# Patient Record
Sex: MALE | Race: BLACK OR AFRICAN AMERICAN | URBAN - METROPOLITAN AREA
[De-identification: names, ages, dates, MRNs, and addresses within clinical notes are randomized per-mention and may not be internally consistent; named-entity substitution may affect disease eponyms.]

---

## 2017-01-07 DIAGNOSIS — I48.91 ATRIAL FIBRILLATION, UNSPECIFIED TYPE (HCC): ICD-10-CM

## 2017-01-09 RX ORDER — WARFARIN SODIUM 5 MG/1
TABLET ORAL
Qty: 90 TAB | Refills: 0 | Status: SHIPPED | OUTPATIENT
Start: 2017-01-09 | End: 2017-07-18 | Stop reason: SDUPTHER

## 2017-01-26 RX ORDER — ENALAPRIL MALEATE 20 MG/1
TABLET ORAL
Qty: 45 TAB | Refills: 3 | Status: SHIPPED | OUTPATIENT
Start: 2017-01-26 | End: 2017-05-26 | Stop reason: SDUPTHER

## 2017-02-05 LAB
INR PPP: 1.9 (ref 0.8–1.2)
PROTHROMBIN TIME: 19.5 SEC (ref 9.1–12)

## 2017-02-07 ENCOUNTER — OFFICE VISIT (OUTPATIENT)
Dept: CARDIOLOGY CLINIC | Age: 52
End: 2017-02-07

## 2017-02-07 VITALS
DIASTOLIC BLOOD PRESSURE: 98 MMHG | SYSTOLIC BLOOD PRESSURE: 142 MMHG | BODY MASS INDEX: 40.43 KG/M2 | HEIGHT: 74 IN | HEART RATE: 87 BPM | WEIGHT: 315 LBS

## 2017-02-07 DIAGNOSIS — E66.9 OBESITY, UNSPECIFIED OBESITY SEVERITY, UNSPECIFIED OBESITY TYPE: ICD-10-CM

## 2017-02-07 DIAGNOSIS — I48.20 CHRONIC ATRIAL FIBRILLATION (HCC): Primary | ICD-10-CM

## 2017-02-07 DIAGNOSIS — I10 ESSENTIAL HYPERTENSION: ICD-10-CM

## 2017-02-07 NOTE — PROGRESS NOTES
Ary Rand MD. [facility]              Patient: Michelle Marie  : 1965      Today's Date: 2017            HISTORY OF PRESENT ILLNESS:     History of Present Illness:  Mr. Jairon Zuñiga is here for follow-up. He is doing well overall. No CP or SOB. He remains active without complaints. Only complaint is some knee pain. BP at home is 136/87. PAST MEDICAL HISTORY:     Past Medical History   Diagnosis Date    Atrial fibrillation ([address] 02    Hypertension      Hypertensive Heart Disease    Long term (current) use of anticoagulants     Obesity     JING (obstructive sleep apnea) 07     Rx C-PAP           MEDICATIONS:     Current Outpatient Prescriptions   Medication Sig Dispense Refill    enalapril (VASOTEC) 20 mg tablet TAKE 1 1/2 TABLETS BY MOUTH DAILY 45 Tab 3    labetalol (NORMODYNE) 200 mg tablet TAKE TWO TABLETS BY MOUTH TWICE PER  Tab 0    warfarin (COUMADIN) 5 mg tablet TAKE 1 TABLET BY MOUTH DAILY. OR AS DIRECTED BY COUMADIN CLINIC. 90 Tab 0    triamterene-hydroCHLOROthiazide (MAXZIDE) 75-50 mg per tablet TAKE 1 TAB BY MOUTH DAILY. 30 Tab 3    warfarin (COUMADIN) 5 mg tablet TAKE 1 TABLET BY MOUTH DAILY. OR AS DIRECTED BY COUMADIN CLINIC. 7 Tab 0    potassium chloride (K-DUR, KLOR-CON) 20 mEq tablet Take 1 Tab by mouth daily. 30 Tab 6    multivitamin (ONE A DAY) tablet Take 1 Tab by mouth daily.  cpap machine kit by Does Not Apply route.  1 Kit 0       Allergies   Allergen Reactions    Shellfish Containing Products Swelling             SOCIAL HISTORY:     Social History   Substance Use Topics    Smoking status: Never Smoker    Smokeless tobacco: Never Used    Alcohol use No      Comment: up to 6 beers/week           FAMILY HISTORY:     Family History   Problem Relation Age of Onset    Coronary Artery Disease Mother     Heart Attack Mother              REVIEW OF SYSTEMS:       Review of Systems:  Constitutional: Negative for fever, chills  HEENT: Negative for vision changes.    Respiratory: Negative for cough  Cardiovascular: Negative for orthopnea, syncope, and PND.    Gastrointestinal: Negative for abdominal pain, diarrhea, or melena  Genitourinary: Negative for dysuria  Musculoskeletal: Negative for myalgias.    Skin: Negative for rash  Heme: No problems bleeding. Neurological: Negative for speech change and focal weakness.                PHYSICAL EXAM:       Physical Exam:   Visit Vitals    BP (!) 142/98    Pulse 87    Ht 6' 2\" (1.88 m)    Wt 345 lb (156.5 kg)    BMI 44.3 kg/m2      Patient appears generally well, mood and affect are appropriate and pleasant.  + obese  HEENT:  Hearing intact, non-icteric, normocephalic, atraumatic.    Neck Exam: Supple  Lung Exam: Clear to auscultation, even breath sounds.    Cardiac Exam: Irregularly, irregular with no murmur  Abdomen: Soft, non-tender, normal bowel sounds. No bruits or masses. + obese  Extremities: MAW, No lower extremity edema. Vascular: 2+ dorsalis pedis pulses bilaterally. Psych: Appropriate affect  Neuro - Grossly intact           LABS / OTHER STUDIES:      Lab Results   Component Value Date/Time    Sodium 140 07/23/2016 11:25 AM    Potassium 3.8 07/23/2016 11:25 AM    Chloride 101 07/23/2016 11:25 AM    CO2 21 07/23/2016 11:25 AM    Anion gap 6 11/03/2009 02:59 PM    Glucose 102 07/23/2016 11:25 AM    BUN 13 07/23/2016 11:25 AM    Creatinine 0.87 07/23/2016 11:25 AM    BUN/Creatinine ratio 15 07/23/2016 11:25 AM    GFR est  07/23/2016 11:25 AM    GFR est non- 07/23/2016 11:25 AM    Calcium 9.4 07/23/2016 11:25 AM    Bilirubin, total 1.9 07/23/2016 11:25 AM    AST (SGOT) 24 07/23/2016 11:25 AM    Alk.  phosphatase 57 07/23/2016 11:25 AM    Protein, total 7.4 07/23/2016 11:25 AM    Albumin 4.3 07/23/2016 11:25 AM    Globulin 3.6 11/03/2009 02:59 PM    A-G Ratio 1.4 07/23/2016 11:25 AM    ALT (SGPT) 24 07/23/2016 11:25 AM       Lab Results   Component Value Date/Time WBC 5.7 07/23/2016 11:25 AM    HGB 13.3 07/23/2016 11:25 AM    HCT 38.6 07/23/2016 11:25 AM    PLATELET 364 83/14/1765 11:25 AM    MCV 87 07/23/2016 11:25 AM     Lab Results   Component Value Date/Time    Cholesterol, total 163 07/23/2016 11:25 AM    HDL Cholesterol 41 07/23/2016 11:25 AM    LDL, calculated 103 07/23/2016 11:25 AM    VLDL, calculated 19 07/23/2016 11:25 AM    Triglyceride 94 07/23/2016 11:25 AM                     CARDIAC DIAGNOSTICS:       Cardiac Evaluation Includes:  Echo 4/14 - TDS, Definity, LVEF 50%, LAE  Treadmill Stress Test 10/22/15 - walked 6 min (7 METS), no ischemic EKG changes or chest pain, he was in Afib reaching 103% MPHR (he had his labetalol this AM)  Echo 6/27/16 - TDS. Definity. LVEF 50%. RV normal. Mod LAE. Mild MR     EKG 4/21/15 - Afib, PVC's, possible anteroseptal infarct age undetermined (PRWP)  EKG 4/28/16 - Afib, HR 80, possible anteroseptal infarct age undetermined, non-specific T wave abn  EKG 2/7/17 - Atrial fibrillation, PVC, Cannot rule out Lateral infarct  Old, PRWP, RAD            ASSESSMENT AND PLAN:       Assessment and Plan:  1) Chronic Afib  - continue rate control and anticoagulation (he prefers coumadin over NOAC's)      2) HTN  - BP has been a little high. - I recommended adding amlodipine 5 mg daily for tighter BP control however he would like to avoid adding any meds for now and work on weight loss. He wants to reassess in one month. 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 Definity. LVEF 50%. RV normal. Mod LAE. Mild MR     6) See me in 1 month.  Patient expressed understanding of the plan - questions were answered.      He is [address] LUDWIG MD, [address]  [address] [facility], [address]  [address] [place] [address]  [address], [address]  [place], [address] [place] [address]  Ph: [phone]   Ph [phone]        ADDENDUM   2/12/2017  Labs 11/22/16 - chol 167, , TG 68, HDL 43

## 2017-02-07 NOTE — MR AVS SNAPSHOT
Visit Information Date & Time Provider Department Dept. Phone Encounter #  
 2/7/2017  8:40 AM Herbert Jarrett MD [facility] [phone] [id number] Your Appointments 7/11/2017  9:20 AM  
ESTABLISHED PATIENT with Herbert Jarrett MD  
[facility] ([facility]) Appt Note: 6 mo fu  
 [address] [facility] [address] 100[address] [address] [address] Upcoming Health Maintenance Date Due DTaP/Tdap/Td series (1 - Tdap) 5/17/1987 FOBT Q 1 YEAR AGE 50-75 5/17/2016 INFLUENZA AGE 9 TO ADULT 8/1/2016 Allergies as of 2/7/2017  Review Complete On: 2/7/2017 By: Darian Kunz RN Severity Noted Reaction Type Reactions Shellfish Containing Products  02/16/2012    Swelling Current Immunizations  Never Reviewed No immunizations on file. Not reviewed this visit You Were Diagnosed With   
  
 Codes Comments Chronic atrial fibrillation (HCC)    -  Primary ICD-10-CM: Q98.2 ICD-9-CM: 427.31 Essential hypertension     ICD-10-CM: I10 
ICD-9-CM: 401.9 Obesity, unspecified obesity severity, unspecified obesity type     ICD-10-CM: E66.9 ICD-9-CM: 278.00 Vitals BP Pulse Height(growth percentile) Weight(growth percentile) BMI Smoking Status (!) 142/98 87 6' 2\" (1.88 m) 345 lb (156.5 kg) 44.3 kg/m2 Never Smoker Vitals History BMI and BSA Data Body Mass Index Body Surface Area 44.3 kg/m 2 2.86 m 2 Preferred Pharmacy Pharmacy Name Phone [id number] [phone] Your Updated Medication List  
  
   
This list is accurate as of: 2/7/17  9:02 AM.  Always use your most recent med list.  
  
  
  
  
 cpap machine kit  
by Does Not Apply route. enalapril 20 mg tablet Commonly known as:  VASOTEC  
TAKE 1 1/2 TABLETS BY MOUTH DAILY labetalol 200 mg tablet Commonly known as:  NORMODYNE  
TAKE TWO TABLETS BY MOUTH TWICE PER DAY  
  
 multivitamin tablet Commonly known as:  ONE A DAY Take 1 Tab by mouth daily. potassium chloride 20 mEq tablet Commonly known as:  K-DUR, KLOR-CON Take 1 Tab by mouth daily. triamterene-hydroCHLOROthiazide 75-50 mg per tablet Commonly known as:  Alinda Karen TAKE 1 TAB BY MOUTH DAILY. * warfarin 5 mg tablet Commonly known as:  COUMADIN  
TAKE 1 TABLET BY MOUTH DAILY. OR AS DIRECTED BY COUMADIN CLINIC. * warfarin 5 mg tablet Commonly known as:  COUMADIN  
TAKE 1 TABLET BY MOUTH DAILY. OR AS DIRECTED BY COUMADIN CLINIC. * Notice: This list has 2 medication(s) that are the same as other medications prescribed for you. Read the directions carefully, and ask your doctor or other care provider to review them with you. We Performed the Following AMB POC EKG ROUTINE W/ 12 LEADS, INTER & REP [54333 CPT(R)] Introducing [facility]! Amy Gee introduces [employer] patient portal. Now you can access parts of your medical record, email your doctor's office, and request medication refills online. 1. In your internet browser, go to [email or web address] 2. Click on the First Time User? Click Here link in the Sign In box. You will see the New Member Sign Up page. 3. Enter your [employer] Access Code exactly as it appears below. You will not need to use this code after youve completed the sign-up process. If you do not sign up before the expiration date, you must request a new code. · [employer] Access Code: [email or web address] Expires: 5/8/2017  9:02 AM 
 
4. Enter the last four digits of your Social Security Number (xxxx) and Date of Birth (mm/dd/yyyy) as indicated and click Submit. You will be taken to the next sign-up page. 5. Create a [employer] ID.  This will be your [employer] login ID and cannot be changed, so think of one that is secure and easy to remember. 6. Create a [employer] password. You can change your password at any time. 7. Enter your Password Reset Question and Answer. This can be used at a later time if you forget your password. 8. Enter your e-mail address. You will receive e-mail notification when new information is available in [address] 9. Click Sign Up. You can now view and download portions of your medical record. 10. Click the Download Summary menu link to download a portable copy of your medical information. If you have questions, please visit the Frequently Asked Questions section of the [employer] website. Remember, [employer] is NOT to be used for urgent needs. For medical emergencies, dial 911. Now available from your iPhone and Android! Please provide this summary of care documentation to your next provider. Your primary care clinician is listed as [address]. If you have any questions after today's visit, please call [phone].

## 2017-02-24 DIAGNOSIS — I10 ESSENTIAL HYPERTENSION, BENIGN: ICD-10-CM

## 2017-03-01 RX ORDER — LABETALOL 200 MG/1
TABLET, FILM COATED ORAL
Qty: 120 TAB | Refills: 0 | Status: SHIPPED | OUTPATIENT
Start: 2017-03-01 | End: 2017-04-01 | Stop reason: SDUPTHER

## 2017-03-07 ENCOUNTER — CLINICAL SUPPORT (OUTPATIENT)
Dept: CARDIOLOGY CLINIC | Age: 52
End: 2017-03-07

## 2017-03-07 VITALS
HEART RATE: 54 BPM | BODY MASS INDEX: 40.43 KG/M2 | HEIGHT: 74 IN | DIASTOLIC BLOOD PRESSURE: 80 MMHG | WEIGHT: 315 LBS | SYSTOLIC BLOOD PRESSURE: 132 MMHG

## 2017-03-07 DIAGNOSIS — I10 ESSENTIAL HYPERTENSION: Primary | ICD-10-CM

## 2017-03-07 NOTE — PROGRESS NOTES
States he has altered his diet. Eating fish & chicken only and minimal pasta. Weight down 5lbs. BP better. HR low.

## 2017-03-19 LAB
INR PPP: 2 (ref 0.8–1.2)
PROTHROMBIN TIME: 20.1 SEC (ref 9.1–12)

## 2017-04-01 DIAGNOSIS — I10 ESSENTIAL HYPERTENSION, BENIGN: ICD-10-CM

## 2017-04-04 RX ORDER — LABETALOL 200 MG/1
TABLET, FILM COATED ORAL
Qty: 120 TAB | Refills: 0 | Status: SHIPPED | OUTPATIENT
Start: 2017-04-04 | End: 2017-04-06 | Stop reason: SDUPTHER

## 2017-04-25 DIAGNOSIS — I10 ESSENTIAL HYPERTENSION, BENIGN: ICD-10-CM

## 2017-04-28 RX ORDER — TRIAMTERENE AND HYDROCHLOROTHIAZIDE 75; 50 MG/1; MG/1
TABLET ORAL
Qty: 30 TAB | Refills: 3 | Status: SHIPPED | OUTPATIENT
Start: 2017-04-28 | End: 2017-08-25 | Stop reason: SDUPTHER

## 2017-05-20 LAB
INR PPP: 1.9 (ref 0.8–1.2)
PROTHROMBIN TIME: 19.3 SEC (ref 9.1–12)

## 2017-07-01 LAB
INR PPP: 2 (ref 0.8–1.2)
PROTHROMBIN TIME: 20.7 SEC (ref 9.1–12)

## 2017-07-06 RX ORDER — ENALAPRIL MALEATE 20 MG/1
TABLET ORAL
Qty: 45 TAB | Refills: 3 | Status: SHIPPED | OUTPATIENT
Start: 2017-07-06 | End: 2017-07-09 | Stop reason: SDUPTHER

## 2017-07-06 NOTE — TELEPHONE ENCOUNTER
Refill of Enalapril maleate 20mg completed per VO of Dr. Trish Del Angel.     Last OV: 2/7/17  Next OV: 7/11/17

## 2017-07-09 RX ORDER — ENALAPRIL MALEATE 20 MG/1
TABLET ORAL
Qty: 45 TAB | Refills: 3 | Status: SHIPPED | OUTPATIENT
Start: 2017-07-09 | End: 2017-07-11 | Stop reason: SDUPTHER

## 2017-07-11 ENCOUNTER — OFFICE VISIT (OUTPATIENT)
Dept: CARDIOLOGY CLINIC | Age: 52
End: 2017-07-11

## 2017-07-11 VITALS
WEIGHT: 315 LBS | OXYGEN SATURATION: 97 % | BODY MASS INDEX: 40.43 KG/M2 | RESPIRATION RATE: 16 BRPM | HEART RATE: 88 BPM | SYSTOLIC BLOOD PRESSURE: 140 MMHG | DIASTOLIC BLOOD PRESSURE: 90 MMHG | HEIGHT: 74 IN

## 2017-07-11 DIAGNOSIS — I48.20 CHRONIC ATRIAL FIBRILLATION (HCC): Primary | ICD-10-CM

## 2017-07-11 DIAGNOSIS — E78.5 DYSLIPIDEMIA: ICD-10-CM

## 2017-07-11 DIAGNOSIS — I10 ESSENTIAL HYPERTENSION: ICD-10-CM

## 2017-07-11 RX ORDER — ENALAPRIL MALEATE 20 MG/1
20 TABLET ORAL 2 TIMES DAILY
Qty: 180 TAB | Refills: 3 | Status: SHIPPED | OUTPATIENT
Start: 2017-07-11 | End: 2018-05-03

## 2017-07-11 NOTE — PROGRESS NOTES
Lottie Peck MD. [facility]              Patient: Eileen Salgado  : 1965      Today's Date: 2017            HISTORY OF PRESENT ILLNESS:     History of Present Illness:  Mr. Soria Renetta here for follow-up. He is doing well overall. No CP or SOB. No dizziness. He says BP at home ~ 130/85. PAST MEDICAL HISTORY:     Past Medical History:   Diagnosis Date    Atrial fibrillation ([address] 02    Hypertension     Hypertensive Heart Disease    Long term (current) use of anticoagulants     Obesity     JING (obstructive sleep apnea) 07    Rx C-PAP         MEDICATIONS:     Current Outpatient Prescriptions   Medication Sig Dispense Refill    enalapril (VASOTEC) 20 mg tablet TAKE 1 1/2 TABLETS BY MOUTH DAILY 45 Tab 3    triamterene-hydroCHLOROthiazide (MAXZIDE) 75-50 mg per tablet TAKE 1 TAB BY MOUTH DAILY. 30 Tab 3    labetalol (NORMODYNE) 200 mg tablet TAKE TWO TABLETS BY MOUTH TWICE PER  Tab 3    warfarin (COUMADIN) 5 mg tablet TAKE 1 TABLET BY MOUTH DAILY. OR AS DIRECTED BY COUMADIN CLINIC. 90 Tab 0    warfarin (COUMADIN) 5 mg tablet TAKE 1 TABLET BY MOUTH DAILY. OR AS DIRECTED BY COUMADIN CLINIC. 7 Tab 0    potassium chloride (K-DUR, KLOR-CON) 20 mEq tablet Take 1 Tab by mouth daily. 30 Tab 6    multivitamin (ONE A DAY) tablet Take 1 Tab by mouth daily.  cpap machine kit by Does Not Apply route.  1 Kit 0       Allergies   Allergen Reactions    Shellfish Containing Products Swelling             SOCIAL HISTORY:     Social History   Substance Use Topics    Smoking status: Never Smoker    Smokeless tobacco: Never Used    Alcohol use No      Comment: up to 6 beers/week           FAMILY HISTORY:     Family History   Problem Relation Age of Onset    Coronary Artery Disease Mother     Heart Attack Mother            REVIEW OF SYSTEMS:        Review of Systems:  Constitutional: Negative for fever, chills  HEENT: Negative for vision changes.    Respiratory: Negative for cough  Cardiovascular: Negative for orthopnea, syncope, and PND.    Gastrointestinal: Negative for abdominal pain, diarrhea, or melena  Genitourinary: Negative for dysuria  Musculoskeletal: Negative for myalgias.    Skin: Negative for rash  Heme: No problems bleeding. Neurological: Negative for speech change and focal weakness.                   PHYSICAL EXAM:        Physical Exam:     Visit Vitals    /90 (BP 1 Location: Left arm, BP Patient Position: Sitting)    Pulse 88    Resp 16    Ht 6' 2\" (1.88 m)    Wt 345 lb (156.5 kg)    SpO2 97%    BMI 44.3 kg/m2       Patient appears generally well, mood and affect are appropriate and pleasant.  + obese  HEENT:  Hearing intact, non-icteric, normocephalic, atraumatic.    Neck Exam: Supple, no bruits   Lung Exam: Clear to auscultation, even breath sounds.    Cardiac Exam: Irregularly, irregular with no murmur - distant   Abdomen: Soft, non-tender, normal bowel sounds. No bruits or masses. + obese  Extremities: MAW, No lower extremity edema. Vascular: 2+ dorsalis pedis pulses bilaterally. Psych: Appropriate affect  Neuro - Grossly intact              LABS / OTHER STUDIES:         Lab Results   Component Value Date/Time    Sodium 140 07/23/2016 11:25 AM    Potassium 3.8 07/23/2016 11:25 AM    Chloride 101 07/23/2016 11:25 AM    CO2 21 07/23/2016 11:25 AM    Anion gap 6 11/03/2009 02:59 PM    Glucose 102 07/23/2016 11:25 AM    BUN 13 07/23/2016 11:25 AM    Creatinine 0.87 07/23/2016 11:25 AM    BUN/Creatinine ratio 15 07/23/2016 11:25 AM    GFR est  07/23/2016 11:25 AM    GFR est non- 07/23/2016 11:25 AM    Calcium 9.4 07/23/2016 11:25 AM    Bilirubin, total 1.9 07/23/2016 11:25 AM    AST (SGOT) 24 07/23/2016 11:25 AM    Alk.  phosphatase 57 07/23/2016 11:25 AM    Protein, total 7.4 07/23/2016 11:25 AM    Albumin 4.3 07/23/2016 11:25 AM    Globulin 3.6 11/03/2009 02:59 PM    A-G Ratio 1.4 07/23/2016 11:25 AM    ALT (SGPT) 24 07/23/2016 11:25 AM Lab Results   Component Value Date/Time    WBC 5.7 07/23/2016 11:25 AM    HGB 13.3 07/23/2016 11:25 AM    HCT 38.6 07/23/2016 11:25 AM    PLATELET 876 35/12/5773 11:25 AM    MCV 87 07/23/2016 11:25 AM         Labs 11/22/16 - chol 167, , TG 68, HDL 43                CARDIAC DIAGNOSTICS:        Cardiac Evaluation Includes:  Echo 4/14 - TDS, Definity, LVEF 50%, LAE  Treadmill Stress Test 10/22/15 - walked 6 min (7 METS), no ischemic EKG changes or chest pain, he was in Afib reaching 103% MPHR (he had his labetalol this AM)  Echo 6/27/16 - TDS. Definity. LVEF 50%. RV normal. Mod LAE. Mild MR      EKG 4/21/15 - Afib, PVC's, possible anteroseptal infarct age undetermined (PRWP)  EKG 4/28/16 - Afib, HR 80, possible anteroseptal infarct age undetermined, non-specific T wave abn  EKG 2/7/17 - Atrial fibrillation, PVC, Cannot rule out Lateral infarct  Old, PRWP, RAD              ASSESSMENT AND PLAN:        Assessment and Plan:  1) Chronic Afib  - continue rate control and anticoagulation (he prefers coumadin over NOAC's)       2) HTN  - BP is borderline high  - He is trying to work on diet   - Will increase enalapril to 40 mg daily and continue maxzide and labetalol.   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 Definity. LVEF 50%. RV normal. Mod LAE. Mild MR      6) Check labs.   See me in 6 months.  Patient expressed understanding of the plan - questions were answered.      He is [employer] fan.       Armand Huertas MD, [address]  [address], [address] [place] [address]  Ph: [phone]    [phone]  Andreina Stringer  ADDENDUM   7/27/2017  Treadmill Stress Test 7/26/17 - walked 4 min (7.0 METS), normal study     I called and left patient a VM. Cleared for DOT from my standpoint.

## 2017-07-11 NOTE — PROGRESS NOTES
Visit Vitals    /90 (BP 1 Location: Left arm, BP Patient Position: Sitting)    Pulse 88    Resp 16    Ht 6' 2\" (1.88 m)    Wt 345 lb (156.5 kg)    SpO2 97%    BMI 44.3 kg/m2

## 2017-07-12 LAB
ALBUMIN SERPL-MCNC: 4.3 G/DL (ref 3.5–5.5)
ALBUMIN/GLOB SERPL: 1.4 {RATIO} (ref 1.2–2.2)
ALP SERPL-CCNC: 56 IU/L (ref 39–117)
ALT SERPL-CCNC: 17 IU/L (ref 0–44)
AST SERPL-CCNC: 20 IU/L (ref 0–40)
BILIRUB SERPL-MCNC: 1.6 MG/DL (ref 0–1.2)
BUN SERPL-MCNC: 17 MG/DL (ref 6–24)
BUN/CREAT SERPL: 17 (ref 9–20)
CALCIUM SERPL-MCNC: 9.4 MG/DL (ref 8.7–10.2)
CHLORIDE SERPL-SCNC: 100 MMOL/L (ref 96–106)
CHOLEST SERPL-MCNC: 167 MG/DL (ref 100–199)
CO2 SERPL-SCNC: 24 MMOL/L (ref 18–29)
CREAT SERPL-MCNC: 1.02 MG/DL (ref 0.76–1.27)
ERYTHROCYTE [DISTWIDTH] IN BLOOD BY AUTOMATED COUNT: 15.1 % (ref 12.3–15.4)
GLOBULIN SER CALC-MCNC: 3.1 G/DL (ref 1.5–4.5)
GLUCOSE SERPL-MCNC: 119 MG/DL (ref 65–99)
HCT VFR BLD AUTO: 37.7 % (ref 37.5–51)
HDLC SERPL-MCNC: 44 MG/DL
HGB BLD-MCNC: 13 G/DL (ref 12.6–17.7)
INTERPRETATION, 910389: NORMAL
LDLC SERPL CALC-MCNC: 106 MG/DL (ref 0–99)
MCH RBC QN AUTO: 30.2 PG (ref 26.6–33)
MCHC RBC AUTO-ENTMCNC: 34.5 G/DL (ref 31.5–35.7)
MCV RBC AUTO: 88 FL (ref 79–97)
PLATELET # BLD AUTO: 171 X10E3/UL (ref 150–379)
POTASSIUM SERPL-SCNC: 3.7 MMOL/L (ref 3.5–5.2)
PROT SERPL-MCNC: 7.4 G/DL (ref 6–8.5)
RBC # BLD AUTO: 4.31 X10E6/UL (ref 4.14–5.8)
SODIUM SERPL-SCNC: 140 MMOL/L (ref 134–144)
TRIGL SERPL-MCNC: 85 MG/DL (ref 0–149)
VLDLC SERPL CALC-MCNC: 17 MG/DL (ref 5–40)
WBC # BLD AUTO: 4.9 X10E3/UL (ref 3.4–10.8)

## 2017-07-18 DIAGNOSIS — I48.91 ATRIAL FIBRILLATION, UNSPECIFIED TYPE (HCC): ICD-10-CM

## 2017-07-21 RX ORDER — WARFARIN SODIUM 5 MG/1
TABLET ORAL
Qty: 90 TAB | Refills: 0 | Status: SHIPPED | OUTPATIENT
Start: 2017-07-21 | End: 2017-08-16 | Stop reason: SDUPTHER

## 2017-07-26 ENCOUNTER — CLINICAL SUPPORT (OUTPATIENT)
Dept: CARDIOLOGY CLINIC | Age: 52
End: 2017-07-26

## 2017-07-26 DIAGNOSIS — R94.31 ABNORMAL EKG: Primary | ICD-10-CM

## 2017-07-26 NOTE — PROGRESS NOTES
Explained procedure to patient, monitoring EKG/HR/BP,  walking on treadmill,  and risks/discomforts. All concerns and questions addressed. Consent obtained. See scanned report.  ordered and Dr. Travis Feliciano read study. ID verified per protocol. Pt  reported no symptoms at completion of protocol.

## 2017-07-27 ENCOUNTER — TELEPHONE (OUTPATIENT)
Dept: CARDIOLOGY CLINIC | Age: 52
End: 2017-07-27

## 2017-07-27 NOTE — TELEPHONE ENCOUNTER
Pt would like to  the lab results today to take with him to the DOT test tomorrow. Please give pt a call back at [phone].       Thank you,  Rogelio Johnson

## 2017-07-27 NOTE — TELEPHONE ENCOUNTER
Patient called regarding his test results, he stated he needs for DOT with his employer.  He can be reached at [phone]

## 2017-07-28 NOTE — TELEPHONE ENCOUNTER
MD Mariely Mckeon, RN        Caller: Unspecified (Yesterday,  9:21 AM)                     Aldona Daughters - stress test was normal.  Please fill out whatever DOT physical thing he needs.  Cleared from my standpoint.       Thanks,   [employer]

## 2017-07-28 NOTE — TELEPHONE ENCOUNTER
L/m for pt all paperwork is ready but that I am at the [place] office today and he will need to come here.

## 2017-08-16 DIAGNOSIS — I48.91 ATRIAL FIBRILLATION, UNSPECIFIED TYPE (HCC): ICD-10-CM

## 2017-08-18 RX ORDER — WARFARIN SODIUM 5 MG/1
TABLET ORAL
Qty: 90 TAB | Refills: 0 | Status: SHIPPED | OUTPATIENT
Start: 2017-08-18 | End: 2018-02-22 | Stop reason: SDUPTHER

## 2017-08-25 DIAGNOSIS — I10 ESSENTIAL HYPERTENSION, BENIGN: ICD-10-CM

## 2017-08-28 DIAGNOSIS — I10 ESSENTIAL HYPERTENSION, BENIGN: ICD-10-CM

## 2017-08-28 RX ORDER — TRIAMTERENE AND HYDROCHLOROTHIAZIDE 75; 50 MG/1; MG/1
TABLET ORAL
Qty: 30 TAB | Refills: 3 | Status: SHIPPED | OUTPATIENT
Start: 2017-08-28 | End: 2017-12-24 | Stop reason: SDUPTHER

## 2017-08-28 RX ORDER — LABETALOL 200 MG/1
TABLET, FILM COATED ORAL
Qty: 120 TAB | Refills: 3 | Status: SHIPPED | OUTPATIENT
Start: 2017-08-28 | End: 2017-12-24 | Stop reason: SDUPTHER

## 2017-10-01 LAB
INR PPP: 2 (ref 0.8–1.2)
PROTHROMBIN TIME: 20 SEC (ref 9.1–12)

## 2017-11-09 ENCOUNTER — DOCUMENTATION ONLY (OUTPATIENT)
Dept: CARDIOLOGY CLINIC | Age: 52
End: 2017-11-09

## 2017-11-09 NOTE — PROGRESS NOTES
Spoke with pt today,states he has been getting INR checks,and I asked pt to call me when he goes so I can look for it and call him back with results. Last INR was 2.0,on 09/30/17. Pt states he will go on sat. for INR check,and I will check on Monday for results. States he work weekdays.

## 2017-11-17 LAB
INR PPP: 1.7 (ref 0.8–1.2)
PROTHROMBIN TIME: 17.3 SEC (ref 9.1–12)

## 2017-11-30 DIAGNOSIS — I48.91 ATRIAL FIBRILLATION, UNSPECIFIED TYPE (HCC): ICD-10-CM

## 2017-12-21 ENCOUNTER — TELEPHONE (OUTPATIENT)
Dept: CARDIOLOGY CLINIC | Age: 52
End: 2017-12-21

## 2017-12-21 DIAGNOSIS — I48.20 CHRONIC ATRIAL FIBRILLATION (HCC): Primary | ICD-10-CM

## 2017-12-21 NOTE — TELEPHONE ENCOUNTER
Patient needs a new new PT/INR order. Please fax to [employer] at  [phone]. Patient would like a return call to confirm order has been sent. He can be reached at [phone] or [phone].  Thank you

## 2017-12-23 LAB
INR PPP: 1.7 (ref 0.8–1.2)
PROTHROMBIN TIME: 17.7 SEC (ref 9.1–12)

## 2017-12-24 DIAGNOSIS — I10 ESSENTIAL HYPERTENSION, BENIGN: ICD-10-CM

## 2017-12-28 RX ORDER — LABETALOL 200 MG/1
TABLET, FILM COATED ORAL
Qty: 120 TAB | Refills: 3 | Status: SHIPPED | OUTPATIENT
Start: 2017-12-28 | End: 2018-04-20 | Stop reason: SDUPTHER

## 2017-12-28 RX ORDER — TRIAMTERENE AND HYDROCHLOROTHIAZIDE 75; 50 MG/1; MG/1
TABLET ORAL
Qty: 30 TAB | Refills: 3 | Status: SHIPPED | OUTPATIENT
Start: 2017-12-28 | End: 2018-04-20 | Stop reason: SDUPTHER

## 2017-12-28 NOTE — TELEPHONE ENCOUNTER
Requested Prescriptions     Pending Prescriptions Disp Refills    labetalol (NORMODYNE) 200 mg tablet [Pharmacy Med Name: LABETALOL  MG TABLET] 120 Tab 3     Sig: TAKE TWO TABLETS BY MOUTH TWICE PER DAY    triamterene-hydroCHLOROthiazide (MAXZIDE) 75-50 mg per tablet [Pharmacy Med Name: TRIAMTERENE-HCTZ 75-50 MG TAB] 30 Tab 3     Sig: TAKE 1 TAB BY MOUTH DAILY.      Last OV 7/11/17  Next OV 1/25/18    Pharmacy verified    Thank you, AP

## 2018-02-04 LAB
INR PPP: 2.3 (ref 0.8–1.2)
PROTHROMBIN TIME: 23.3 SEC (ref 9.1–12)

## 2018-02-05 ENCOUNTER — TELEPHONE (OUTPATIENT)
Dept: CARDIOLOGY CLINIC | Age: 53
End: 2018-02-05

## 2018-02-22 DIAGNOSIS — I48.91 ATRIAL FIBRILLATION, UNSPECIFIED TYPE (HCC): ICD-10-CM

## 2018-02-22 NOTE — TELEPHONE ENCOUNTER
Pharmacy verified. Requested Prescriptions     Pending Prescriptions Disp Refills    warfarin (COUMADIN) 5 mg tablet 90 Tab 0     Thanks!   Blaire Jeff

## 2018-02-23 RX ORDER — WARFARIN SODIUM 5 MG/1
TABLET ORAL
Qty: 90 TAB | Refills: 0 | Status: SHIPPED | OUTPATIENT
Start: 2018-02-23 | End: 2018-05-22 | Stop reason: SDUPTHER

## 2018-04-07 LAB — INR, EXTERNAL: 1.9 (ref 2–3)

## 2018-04-08 LAB
INR PPP: 1.9 (ref 0.8–1.2)
PROTHROMBIN TIME: 19.4 SEC (ref 9.1–12)

## 2018-04-11 ENCOUNTER — TELEPHONE ANTICOAG (OUTPATIENT)
Dept: CARDIOLOGY CLINIC | Age: 53
End: 2018-04-11

## 2018-04-11 NOTE — PROGRESS NOTES

## 2018-05-03 ENCOUNTER — OFFICE VISIT (OUTPATIENT)
Dept: CARDIOLOGY CLINIC | Age: 53
End: 2018-05-03

## 2018-05-03 VITALS
DIASTOLIC BLOOD PRESSURE: 90 MMHG | WEIGHT: 315 LBS | SYSTOLIC BLOOD PRESSURE: 130 MMHG | HEIGHT: 74 IN | HEART RATE: 76 BPM | OXYGEN SATURATION: 99 % | RESPIRATION RATE: 16 BRPM | BODY MASS INDEX: 40.43 KG/M2

## 2018-05-03 DIAGNOSIS — I10 ESSENTIAL HYPERTENSION: ICD-10-CM

## 2018-05-03 DIAGNOSIS — I48.0 PAROXYSMAL ATRIAL FIBRILLATION (HCC): Primary | ICD-10-CM

## 2018-05-03 RX ORDER — ENALAPRIL MALEATE 20 MG/1
20 TABLET ORAL 2 TIMES DAILY
Qty: 60 TAB | Refills: 12 | Status: SHIPPED | OUTPATIENT
Start: 2018-05-03 | End: 2018-05-30 | Stop reason: SDUPTHER

## 2018-05-03 RX ORDER — ENALAPRIL MALEATE 20 MG/1
20 TABLET ORAL 2 TIMES DAILY
Qty: 180 TAB | Refills: 3 | Status: SHIPPED | OUTPATIENT
Start: 2018-05-03 | End: 2018-05-03 | Stop reason: SDUPTHER

## 2018-05-03 NOTE — PROGRESS NOTES
Chief Complaint   Patient presents with    Annual Exam     afib       1. Have you been to the ER, urgent care clinic since your last visit? Hospitalized since your last visit? No    2. Have you seen or consulted any other health care providers outside of the [address] [place] [address] since your last visit? Include any pap smears or colon screening.  No

## 2018-05-03 NOTE — PROGRESS NOTES
Allie Soto MD. [facility] [place]              Patient: Cali Chandler  : 1965      Today's Date: 5/3/2018            HISTORY OF PRESENT ILLNESS:     History of Present Illness:  Mr. Matt Kirkland is here for follow-up. Feels well. -130/85-90 at home. No CP or SOB. No complaints. PAST MEDICAL HISTORY:     Past Medical History:   Diagnosis Date    Atrial fibrillation ([address] 02    Hypertension     Hypertensive Heart Disease    Long term (current) use of anticoagulants     Obesity     JING (obstructive sleep apnea) 07    Rx C-PAP             MEDICATIONS:     Current Outpatient Prescriptions   Medication Sig Dispense Refill    labetalol (NORMODYNE) 200 mg tablet TAKE TWO TABLETS BY MOUTH TWICE PER  Tab 0    triamterene-hydroCHLOROthiazide (MAXZIDE) 75-50 mg per tablet TAKE 1 TAB BY MOUTH DAILY. 30 Tab 0    enalapril (VASOTEC) 20 mg tablet TAKE 1 1/2 TABLETS BY MOUTH DAILY 45 Tab 1    warfarin (COUMADIN) 5 mg tablet TAKE 1 TABLET BY MOUTH DAILY. OR AS DIRECTED BY COUMADIN CLINIC. (Patient taking differently: Pt takes 0.5 tabs on M&W and 1 tablet on the remaining days.) 90 Tab 0    warfarin (COUMADIN) 5 mg tablet TAKE 1 TABLET BY MOUTH DAILY. OR AS DIRECTED BY COUMADIN CLINIC. 7 Tab 0    multivitamin (ONE A DAY) tablet Take 1 Tab by mouth daily.  cpap machine kit by Does Not Apply route.  1 Kit 0       Allergies   Allergen Reactions    Shellfish Containing Products Swelling             SOCIAL HISTORY:     Social History   Substance Use Topics    Smoking status: Never Smoker    Smokeless tobacco: Never Used    Alcohol use 3.6 oz/week     0 Standard drinks or equivalent, 6 Cans of beer per week      Comment: up to 6 beers/week           FAMILY HISTORY:     Family History   Problem Relation Age of Onset    Coronary Artery Disease Mother     Heart Attack Mother            REVIEW OF SYSTEMS:        Review of Systems:  Constitutional: Negative for fever, chills  HEENT: Negative for vision changes.    Respiratory: Negative for cough  Cardiovascular: Negative for orthopnea, syncope, and PND.    Gastrointestinal: Negative for abdominal pain, diarrhea, or melena  Genitourinary: Negative for dysuria  Musculoskeletal: Negative for myalgias.    Skin: Negative for rash  Heme: No problems bleeding. Neurological: Negative for speech change and focal weakness.                   PHYSICAL EXAM:        Physical Exam:   Visit Vitals    /90 (BP 1 Location: Left arm, BP Patient Position: Sitting)    Pulse 76    Resp 16    Ht 6' 2\" (1.88 m)    Wt 345 lb (156.5 kg)    SpO2 99%    BMI 44.3 kg/m2       Patient appears generally well, mood and affect are appropriate and pleasant.  + obese  HEENT:  Hearing intact, non-icteric, normocephalic, atraumatic.    Neck Exam: Supple, no bruits   Lung Exam: Clear to auscultation, even breath sounds.    Cardiac Exam: Irregularly, irregular with no murmur - distant   Abdomen: Soft, non-tender,  + obese  Extremities: MAW, No lower extremity edema. Psych: Appropriate affect  Neuro - Grossly intact              LABS / OTHER STUDIES:          Lab Results   Component Value Date/Time    Sodium 140 07/11/2017 10:14 AM    Potassium 3.7 07/11/2017 10:14 AM    Chloride 100 07/11/2017 10:14 AM    CO2 24 07/11/2017 10:14 AM    Anion gap 6 11/03/2009 02:59 PM    Glucose 119 (H) 07/11/2017 10:14 AM    BUN 17 07/11/2017 10:14 AM    Creatinine 1.02 07/11/2017 10:14 AM    BUN/Creatinine ratio 17 07/11/2017 10:14 AM    GFR est AA 98 07/11/2017 10:14 AM    GFR est non-AA 85 07/11/2017 10:14 AM    Calcium 9.4 07/11/2017 10:14 AM    Bilirubin, total 1.6 (H) 07/11/2017 10:14 AM    AST (SGOT) 20 07/11/2017 10:14 AM    Alk.  phosphatase 56 07/11/2017 10:14 AM    Protein, total 7.4 07/11/2017 10:14 AM    Albumin 4.3 07/11/2017 10:14 AM    Globulin 3.6 11/03/2009 02:59 PM    A-G Ratio 1.4 07/11/2017 10:14 AM    ALT (SGPT) 17 07/11/2017 10:14 AM       Lab Results Component Value Date/Time    Cholesterol, total 167 07/11/2017 10:14 AM    HDL Cholesterol 44 07/11/2017 10:14 AM    LDL, calculated 106 (H) 07/11/2017 10:14 AM    VLDL, calculated 17 07/11/2017 10:14 AM    Triglyceride 85 07/11/2017 10:14 AM         Lab Results   Component Value Date/Time    WBC 4.9 07/11/2017 10:14 AM    HGB 13.0 07/11/2017 10:14 AM    HCT 37.7 07/11/2017 10:14 AM    PLATELET 655 39/79/3241 10:14 AM    MCV 88 07/11/2017 10:14 AM               CARDIAC DIAGNOSTICS:        Cardiac Evaluation Includes:  Echo 4/14 - TDS, Definity, LVEF 50%, LAE  Treadmill Stress Test 10/22/15 - walked 6 min (7 METS), no ischemic EKG changes or chest pain, he was in Afib reaching 103% MPHR (he had his labetalol this AM)  Echo 6/27/16 - TDS. Definity. LVEF 50%. RV normal. Mod LAE. Mild MR  Treadmill Stress Test 7/26/17 - walked 4 min (7.0 METS), normal study         EKG 4/21/15 - Afib, PVC's, possible anteroseptal infarct age undetermined (PRWP)  EKG 4/28/16 - Afib, HR 80, possible anteroseptal infarct age undetermined, non-specific T wave abn  EKG 2/7/17 - Atrial fibrillation, PVC, Cannot rule out Lateral infarct  Old, PRWP, RAD  EKG 5/3/18 - Afib, PRWP, PVC              ASSESSMENT AND PLAN:        Assessment and Plan:  1) Chronic Afib  - continue rate control and anticoagulation (he prefers coumadin over NOAC's)       2) HTN  - BP is borderline high  - He is trying to work on diet   - Goal BP at home < 130/80 ideally   - Increase enalapril to 20 BID. Continue other meds. If BP remains high, he is to call me. - recheck labs        3) Morbid Obesity  - he's lost 30 lbs but then gained it back - he is working on trying to lose weight       4) Pre-participation stress test for the [employer] in 7/17 was normal  - he can participate from my standpoint   - he will need stress test every 2 years       5) Abnormal EKG - lateral Q's   - he has no cardiac complaints  - Echo 6/27/16 - TDS. Definity. LVEF 50%. RV normal. Mod LAE. Mild MR      6) Check labs. See me in 6 months.  Patient expressed understanding of the plan - questions were answered.      He is [employer] fan. Lives with his girl friend.    Koki Scott MD, [address], [facility] [address]. Allison Riggins.  [place], [address] [place] [address]  Ph: [phone]   Ph [phone]

## 2018-05-19 LAB — INR, EXTERNAL: 3.1 (ref 2–3)

## 2018-05-20 LAB
BUN SERPL-MCNC: 13 MG/DL (ref 6–24)
BUN/CREAT SERPL: 13 (ref 9–20)
CALCIUM SERPL-MCNC: 9.2 MG/DL (ref 8.7–10.2)
CHLORIDE SERPL-SCNC: 98 MMOL/L (ref 96–106)
CO2 SERPL-SCNC: 23 MMOL/L (ref 18–29)
CREAT SERPL-MCNC: 0.98 MG/DL (ref 0.76–1.27)
ERYTHROCYTE [DISTWIDTH] IN BLOOD BY AUTOMATED COUNT: 13.8 % (ref 12.3–15.4)
GFR SERPLBLD CREATININE-BSD FMLA CKD-EPI: 102 ML/MIN/1.73
GFR SERPLBLD CREATININE-BSD FMLA CKD-EPI: 88 ML/MIN/1.73
GLUCOSE SERPL-MCNC: 77 MG/DL (ref 65–99)
HCT VFR BLD AUTO: 37 % (ref 37.5–51)
HGB BLD-MCNC: 12.7 G/DL (ref 13–17.7)
INR PPP: 3.1 (ref 0.8–1.2)
MCH RBC QN AUTO: 30.8 PG (ref 26.6–33)
MCHC RBC AUTO-ENTMCNC: 34.3 G/DL (ref 31.5–35.7)
MCV RBC AUTO: 90 FL (ref 79–97)
PLATELET # BLD AUTO: 178 X10E3/UL (ref 150–379)
POTASSIUM SERPL-SCNC: 4.4 MMOL/L (ref 3.5–5.2)
PROTHROMBIN TIME: 30.2 SEC (ref 9.1–12)
RBC # BLD AUTO: 4.13 X10E6/UL (ref 4.14–5.8)
SODIUM SERPL-SCNC: 142 MMOL/L (ref 134–144)
WBC # BLD AUTO: 4.2 X10E3/UL (ref 3.4–10.8)

## 2018-05-22 ENCOUNTER — TELEPHONE ANTICOAG (OUTPATIENT)
Dept: CARDIOLOGY CLINIC | Age: 53
End: 2018-05-22

## 2018-05-22 DIAGNOSIS — I48.91 ATRIAL FIBRILLATION, UNSPECIFIED TYPE (HCC): ICD-10-CM

## 2018-05-22 NOTE — PROGRESS NOTES

## 2018-05-23 DIAGNOSIS — I10 ESSENTIAL HYPERTENSION, BENIGN: ICD-10-CM

## 2018-05-24 RX ORDER — WARFARIN SODIUM 5 MG/1
TABLET ORAL
Qty: 90 TAB | Refills: 0 | Status: SHIPPED | OUTPATIENT
Start: 2018-05-24 | End: 2018-09-13 | Stop reason: SDUPTHER

## 2018-05-24 RX ORDER — TRIAMTERENE AND HYDROCHLOROTHIAZIDE 75; 50 MG/1; MG/1
TABLET ORAL
Qty: 30 TAB | Refills: 0 | Status: SHIPPED | OUTPATIENT
Start: 2018-05-24 | End: 2018-06-22 | Stop reason: SDUPTHER

## 2018-05-30 DIAGNOSIS — I48.0 PAROXYSMAL ATRIAL FIBRILLATION (HCC): ICD-10-CM

## 2018-05-30 DIAGNOSIS — I10 ESSENTIAL HYPERTENSION: ICD-10-CM

## 2018-05-30 RX ORDER — ENALAPRIL MALEATE 20 MG/1
20 TABLET ORAL 2 TIMES DAILY
Qty: 60 TAB | Refills: 12 | Status: SHIPPED | OUTPATIENT
Start: 2018-05-30 | End: 2019-03-18 | Stop reason: SINTOL

## 2018-06-01 DIAGNOSIS — I10 ESSENTIAL HYPERTENSION, BENIGN: ICD-10-CM

## 2018-06-04 DIAGNOSIS — I48.19 PERSISTENT ATRIAL FIBRILLATION (HCC): Primary | ICD-10-CM

## 2018-06-04 RX ORDER — ENALAPRIL MALEATE 20 MG/1
TABLET ORAL
Qty: 45 TAB | Refills: 2 | Status: SHIPPED | OUTPATIENT
Start: 2018-06-04 | End: 2018-11-15 | Stop reason: SDUPTHER

## 2018-06-04 RX ORDER — LABETALOL 200 MG/1
TABLET, FILM COATED ORAL
Qty: 120 TAB | Refills: 0 | Status: SHIPPED | OUTPATIENT
Start: 2018-06-04 | End: 2018-07-02 | Stop reason: SDUPTHER

## 2018-06-07 RX ORDER — WARFARIN SODIUM 5 MG/1
TABLET ORAL
Qty: 90 TAB | Refills: 0 | Status: SHIPPED | OUTPATIENT
Start: 2018-06-07 | End: 2018-11-01 | Stop reason: SDUPTHER

## 2018-07-02 DIAGNOSIS — I10 ESSENTIAL HYPERTENSION, BENIGN: ICD-10-CM

## 2018-07-03 RX ORDER — LABETALOL 200 MG/1
TABLET, FILM COATED ORAL
Qty: 120 TAB | Refills: 0 | Status: SHIPPED | OUTPATIENT
Start: 2018-07-03 | End: 2018-08-01 | Stop reason: SDUPTHER

## 2018-07-15 LAB
INR PPP: 2.2 (ref 0.8–1.2)
PROTHROMBIN TIME: 22.2 SEC (ref 9.1–12)

## 2018-07-18 ENCOUNTER — TELEPHONE ANTICOAG (OUTPATIENT)
Dept: CARDIOLOGY CLINIC | Age: 53
End: 2018-07-18

## 2018-07-18 LAB — INR, EXTERNAL: 2.2 (ref 2–3)

## 2018-07-23 ENCOUNTER — TELEPHONE (OUTPATIENT)
Dept: CARDIOLOGY CLINIC | Age: 53
End: 2018-07-23

## 2018-07-23 NOTE — TELEPHONE ENCOUNTER
Returned call. Informed patient requested records have been set at  for . Patient states he will probably be in tomorrow to .

## 2018-07-23 NOTE — TELEPHONE ENCOUNTER
Pt requesting his most recent EKG, stress test, and coumadin levels for . Pt wants a call when ready to be picked up.      Phone: [phone]

## 2018-08-01 DIAGNOSIS — I10 ESSENTIAL HYPERTENSION, BENIGN: ICD-10-CM

## 2018-08-02 RX ORDER — LABETALOL 200 MG/1
TABLET, FILM COATED ORAL
Qty: 120 TAB | Refills: 0 | Status: SHIPPED | OUTPATIENT
Start: 2018-08-02 | End: 2018-09-04 | Stop reason: SDUPTHER

## 2018-09-04 DIAGNOSIS — I10 ESSENTIAL HYPERTENSION, BENIGN: ICD-10-CM

## 2018-09-04 RX ORDER — LABETALOL 200 MG/1
TABLET, FILM COATED ORAL
Qty: 120 TAB | Refills: 1 | Status: SHIPPED | OUTPATIENT
Start: 2018-09-04 | End: 2018-11-05 | Stop reason: SDUPTHER

## 2018-09-13 DIAGNOSIS — I48.91 ATRIAL FIBRILLATION, UNSPECIFIED TYPE (HCC): ICD-10-CM

## 2018-09-16 LAB
INR PPP: 2.6 (ref 0.8–1.2)
PROTHROMBIN TIME: 25.7 SEC (ref 9.1–12)

## 2018-09-18 NOTE — TELEPHONE ENCOUNTER
Pharmacy verified. Requested Prescriptions     Pending Prescriptions Disp Refills    warfarin (COUMADIN) 5 mg tablet [Pharmacy Med Name: WARFARIN SODIUM 5 MG TABLET] 90 Tab 0     Sig: TAKE 1 TABLET BY MOUTH DAILY. OR AS DIRECTED BY COUMADIN CLINIC.      Thanks

## 2018-09-18 NOTE — TELEPHONE ENCOUNTER
Am going to forward this refill to our coumadin clinic. It looks as if he has not been checked since July? Need to confirm.

## 2018-09-20 RX ORDER — WARFARIN SODIUM 5 MG/1
TABLET ORAL
Qty: 90 TAB | Refills: 0 | Status: SHIPPED | OUTPATIENT
Start: 2018-09-20 | End: 2018-11-15 | Stop reason: SDUPTHER

## 2018-09-21 ENCOUNTER — TELEPHONE (OUTPATIENT)
Dept: CARDIOLOGY CLINIC | Age: 53
End: 2018-09-21

## 2018-09-21 NOTE — TELEPHONE ENCOUNTER
Refill requested for Warafin 5mg [facility] confirmed. Phone [phone] Thanks

## 2018-10-13 LAB — INR, EXTERNAL: 3.1 (ref 2–3)

## 2018-10-14 LAB
INR PPP: 3.1 (ref 0.8–1.2)
PROTHROMBIN TIME: 30.5 SEC (ref 9.1–12)

## 2018-10-25 DIAGNOSIS — I10 ESSENTIAL HYPERTENSION, BENIGN: ICD-10-CM

## 2018-10-25 DIAGNOSIS — I48.91 ATRIAL FIBRILLATION, UNSPECIFIED TYPE (HCC): ICD-10-CM

## 2018-10-26 RX ORDER — TRIAMTERENE AND HYDROCHLOROTHIAZIDE 75; 50 MG/1; MG/1
TABLET ORAL
Qty: 30 TAB | Refills: 0 | Status: SHIPPED | OUTPATIENT
Start: 2018-10-26 | End: 2018-11-23 | Stop reason: SDUPTHER

## 2018-10-31 RX ORDER — WARFARIN SODIUM 5 MG/1
TABLET ORAL
Qty: 7 TAB | Refills: 0 | Status: CANCELLED | OUTPATIENT
Start: 2018-10-31

## 2018-10-31 NOTE — TELEPHONE ENCOUNTER
Rita Bills keep getting refill requests for Mr Manjinder Motley warfarin however, it looks like his last check was July!

## 2018-11-01 ENCOUNTER — TELEPHONE ANTICOAG (OUTPATIENT)
Dept: CARDIOLOGY CLINIC | Age: 53
End: 2018-11-01

## 2018-11-01 RX ORDER — WARFARIN SODIUM 5 MG/1
TABLET ORAL
Qty: 90 TAB | Refills: 0 | Status: SHIPPED | OUTPATIENT
Start: 2018-11-01 | End: 2018-11-15 | Stop reason: SDUPTHER

## 2018-11-11 LAB
INR PPP: 2.1 (ref 0.8–1.2)
PROTHROMBIN TIME: 20.6 SEC (ref 9.1–12)

## 2018-11-13 ENCOUNTER — TELEPHONE ANTICOAG (OUTPATIENT)
Dept: CARDIOLOGY CLINIC | Age: 53
End: 2018-11-13

## 2018-11-13 LAB — INR, EXTERNAL: 2.1 (ref 2–3)

## 2018-11-15 ENCOUNTER — OFFICE VISIT (OUTPATIENT)
Dept: CARDIOLOGY CLINIC | Age: 53
End: 2018-11-15

## 2018-11-15 VITALS
SYSTOLIC BLOOD PRESSURE: 144 MMHG | WEIGHT: 315 LBS | RESPIRATION RATE: 16 BRPM | BODY MASS INDEX: 40.43 KG/M2 | HEART RATE: 82 BPM | DIASTOLIC BLOOD PRESSURE: 86 MMHG | HEIGHT: 74 IN

## 2018-11-15 DIAGNOSIS — E78.5 DYSLIPIDEMIA: ICD-10-CM

## 2018-11-15 DIAGNOSIS — I10 ESSENTIAL HYPERTENSION: ICD-10-CM

## 2018-11-15 DIAGNOSIS — I48.20 CHRONIC ATRIAL FIBRILLATION (HCC): Primary | ICD-10-CM

## 2018-11-15 NOTE — PROGRESS NOTES
Chief Complaint   Patient presents with    Irregular Heart Beat     AFIB    Hypertension     Visit Vitals  /86 (BP 1 Location: Left arm, BP Patient Position: Sitting)   Pulse 82   Resp 16   Ht 6' 2\" (1.88 m)   Wt (!) 355 lb (161 kg)   BMI 45.58 kg/m²

## 2018-11-15 NOTE — PROGRESS NOTES
Deborah Bearden MD. [facility] [place]              Patient: Sanjeev Paiz  : 1965      Today's Date: 11/15/2018            HISTORY OF PRESENT ILLNESS:     History of Present Illness:  Here for follow-up. Sprained MCL a few months playing basketball. /82 at home. PAST MEDICAL HISTORY:     Past Medical History:   Diagnosis Date    Atrial fibrillation ([address] 02    Hypertension     Hypertensive Heart Disease    Long term (current) use of anticoagulants     Obesity     JING (obstructive sleep apnea) 07    Rx C-PAP             MEDICATIONS:     Current Outpatient Medications   Medication Sig Dispense Refill    labetalol (NORMODYNE) 200 mg tablet TAKE TWO TABLETS BY MOUTH TWICE PER  Tab 0    triamterene-hydroCHLOROthiazide (MAXZIDE) 75-50 mg per tablet TAKE 1 TAB BY MOUTH DAILY. 30 Tab 0    enalapril (VASOTEC) 20 mg tablet Take 1 Tab by mouth two (2) times a day. 60 Tab 12    warfarin (COUMADIN) 5 mg tablet TAKE 1 TABLET BY MOUTH DAILY. OR AS DIRECTED BY COUMADIN CLINIC. (Patient taking differently: TAKE 1 TABLET BY MOUTH DAILY. OR AS DIRECTED BY COUMADIN CLINIC.) 7 Tab 0    multivitamin (ONE A DAY) tablet Take 1 Tab by mouth daily.  cpap machine kit by Does Not Apply route. 1 Kit 0       Allergies   Allergen Reactions    Shellfish Containing Products Swelling             SOCIAL HISTORY:     Social History     Tobacco Use    Smoking status: Never Smoker    Smokeless tobacco: Never Used   Substance Use Topics    Alcohol use:  Yes     Alcohol/week: 3.6 oz     Types: 6 Cans of beer per week     Comment: up to 6 beers/week    Drug use: No         FAMILY HISTORY:     Family History   Problem Relation Age of Onset    Coronary Artery Disease Mother     Heart Attack Mother              REVIEW OF SYSTEMS:        Review of Systems:  Constitutional: Negative for fever, chills  HEENT: Negative for vision changes.    Respiratory: Negative for cough  Cardiovascular: Negative for orthopnea, syncope, and PND.    Gastrointestinal: Negative for abdominal pain, diarrhea, or melena  Genitourinary: Negative for dysuria  Musculoskeletal: Negative for myalgias.    Skin: Negative for rash  Heme: No problems bleeding. Neurological: Negative for speech change and focal weakness.                   PHYSICAL EXAM:        Physical Exam:     Visit Vitals  /86 (BP 1 Location: Left arm, BP Patient Position: Sitting)   Pulse 82   Resp 16   Ht 6' 2\" (1.88 m)   Wt (!) 355 lb (161 kg)   BMI 45.58 kg/m²         Patient appears generally well, mood and affect are appropriate and pleasant.  + obese  HEENT:  Hearing intact, non-icteric, normocephalic, atraumatic.    Neck Exam: Supple, no bruits   Lung Exam: Clear to auscultation, even breath sounds.    Cardiac Exam: Irregularly, irregular with no murmur - distant   Abdomen: Soft, non-tender,  + obese  Extremities: MAW, No pitting lower extremity edema. Psych: Appropriate affect  Neuro - Grossly intact              LABS / OTHER STUDIES:          Lab Results   Component Value Date/Time    Sodium 142 05/19/2018 12:06 PM    Potassium 4.4 05/19/2018 12:06 PM    Chloride 98 05/19/2018 12:06 PM    CO2 23 05/19/2018 12:06 PM    Anion gap 6 11/03/2009 02:59 PM    Glucose 77 05/19/2018 12:06 PM    BUN 13 05/19/2018 12:06 PM    Creatinine 0.98 05/19/2018 12:06 PM    BUN/Creatinine ratio 13 05/19/2018 12:06 PM    GFR est  05/19/2018 12:06 PM    GFR est non-AA 88 05/19/2018 12:06 PM    Calcium 9.2 05/19/2018 12:06 PM    Bilirubin, total 1.6 (H) 07/11/2017 10:14 AM    AST (SGOT) 20 07/11/2017 10:14 AM    Alk.  phosphatase 56 07/11/2017 10:14 AM    Protein, total 7.4 07/11/2017 10:14 AM    Albumin 4.3 07/11/2017 10:14 AM    Globulin 3.6 11/03/2009 02:59 PM    A-G Ratio 1.4 07/11/2017 10:14 AM    ALT (SGPT) 17 07/11/2017 10:14 AM       Lab Results   Component Value Date/Time    WBC 4.2 05/19/2018 12:06 PM    HGB 12.7 (L) 05/19/2018 12:06 PM    HCT 37.0 (L) 05/19/2018 12:06 PM    PLATELET 029 00/85/5766 12:06 PM    MCV 90 05/19/2018 12:06 PM       Lab Results   Component Value Date/Time    Cholesterol, total 167 07/11/2017 10:14 AM    HDL Cholesterol 44 07/11/2017 10:14 AM    LDL, calculated 106 (H) 07/11/2017 10:14 AM    VLDL, calculated 17 07/11/2017 10:14 AM    Triglyceride 85 07/11/2017 10:14 AM               CARDIAC DIAGNOSTICS:        Cardiac Evaluation Includes:  Echo 4/14 - TDS, Definity, LVEF 50%, LAE  Treadmill Stress Test 10/22/15 - walked 6 min (7 METS), no ischemic EKG changes or chest pain, he was in Afib reaching 103% MPHR (he had his labetalol this AM)  Echo 6/27/16 - TDS. Definity. LVEF 50%. RV normal. Mod LAE. Mild MR  Treadmill Stress Test 7/26/17 - walked 4 min (7.0 METS), normal study          EKG 4/21/15 - Afib, PVC's, possible anteroseptal infarct age undetermined (PRWP)  EKG 4/28/16 - Afib, HR 80, possible anteroseptal infarct age undetermined, non-specific T wave abn  EKG 2/7/17 - Atrial fibrillation, PVC, Cannot rule out Lateral infarct  Old, PRWP, RAD  EKG 5/3/18 - Afib, PRWP, PVC              ASSESSMENT AND PLAN:        Assessment and Plan:  1) Chronic Afib  - continue rate control and anticoagulation (he prefers coumadin over NOAC's)       2) HTN  - BP is borderline high - 138/82 or so at home  - He is trying to work on diet   - Goal BP at home < 130/80 ideally -- home readings are close - discussed options - he wants to work on weight loss before adding meds (such amlodipine)       3) Morbid Obesity  - had lost 30 lbs but then gained it back - he is working on trying to lose weight       4) Pre-participation stress test for the [employer] in 7/17 was normal  - he can participate from my standpoint   - he will need stress test every 2 years - check at next visit       5) Abnormal EKG - lateral Q's   - he has no cardiac complaints  - Echo 6/27/16 - TDS. Definity. LVEF 50%. RV normal. Mod LAE.  Mild MR      6) See me in 6 months.  Patient expressed understanding of the plan - questions were answered.      He is [employer] fan. Lives with his girl friend. Working on renovating bathroom.    [address] Botello MD, [address]  Ph: [phone]   Ph [phone]

## 2018-12-23 LAB
INR PPP: 2.5 (ref 0.8–1.2)
PROTHROMBIN TIME: 24.4 SEC (ref 9.1–12)

## 2019-01-20 LAB
INR PPP: 2.1 (ref 0.8–1.2)
PROTHROMBIN TIME: 20.6 SEC (ref 9.1–12)

## 2019-02-02 DIAGNOSIS — I10 ESSENTIAL HYPERTENSION, BENIGN: ICD-10-CM

## 2019-02-03 RX ORDER — LABETALOL 200 MG/1
TABLET, FILM COATED ORAL
Qty: 120 TAB | Refills: 1 | Status: SHIPPED | OUTPATIENT
Start: 2019-02-03 | End: 2019-04-03 | Stop reason: SDUPTHER

## 2019-02-17 LAB
INR PPP: 2.3 (ref 0.8–1.2)
PROTHROMBIN TIME: 22.5 SEC (ref 9.1–12)

## 2019-03-18 ENCOUNTER — TELEPHONE (OUTPATIENT)
Dept: CARDIOLOGY CLINIC | Age: 54
End: 2019-03-18

## 2019-03-18 DIAGNOSIS — I10 ESSENTIAL HYPERTENSION: Primary | ICD-10-CM

## 2019-03-18 RX ORDER — AMLODIPINE BESYLATE 5 MG/1
5 TABLET ORAL DAILY
Qty: 90 TAB | Refills: 2 | Status: SHIPPED | OUTPATIENT
Start: 2019-03-18 | End: 2019-06-06

## 2019-03-18 NOTE — TELEPHONE ENCOUNTER
Patient states he went to [facility] over the weekend and was taken off of his Enalapril 20 mg due to angioedema. He was told to call & ask the doctor if he wants to put the patient on a different medication.       Phone: [phone]

## 2019-03-18 NOTE — TELEPHONE ENCOUNTER
Clinic note rec'd from [facility] and in Dr Roseline Crooks office so that he may address this message.

## 2019-03-18 NOTE — TELEPHONE ENCOUNTER
Add amlodipine 5 mg daily. Watch for LE swelling on amlodipine. Also, get enough K+ in diet since on diuretics (and now ACE-I off) --- recheck a BMP in 2 weeks. Follow BP at home and give us readings in 2 weeks.      Thanks,   SK

## 2019-03-23 LAB
INR PPP: 1.7 (ref 0.8–1.2)
PROTHROMBIN TIME: 16.8 SEC (ref 9.1–12)

## 2019-04-01 DIAGNOSIS — I10 ESSENTIAL HYPERTENSION: ICD-10-CM

## 2019-04-03 DIAGNOSIS — I10 ESSENTIAL HYPERTENSION, BENIGN: ICD-10-CM

## 2019-04-09 RX ORDER — LABETALOL 200 MG/1
TABLET, FILM COATED ORAL
Qty: 120 TAB | Refills: 1 | Status: SHIPPED | OUTPATIENT
Start: 2019-04-09 | End: 2019-06-01 | Stop reason: SDUPTHER

## 2019-05-07 ENCOUNTER — TELEPHONE (OUTPATIENT)
Dept: CARDIOLOGY CLINIC | Age: 54
End: 2019-05-07

## 2019-05-07 DIAGNOSIS — I48.91 ATRIAL FIBRILLATION, UNSPECIFIED TYPE (HCC): ICD-10-CM

## 2019-05-07 NOTE — TELEPHONE ENCOUNTER
Margie Zamora just received a prescription request for coumadin from this pt. He saw Dr. Wale Newton on 11/2018, and the last note from you was 11/2018 as well. He has still been having his Pt/INR's drawn. His last INR was 1/7 on 3/22/19. The last time we refilled his coumadin was in 2016 and there were only 7 pills, 0 refills. How should I continue?   Thanks for your help!!

## 2019-05-08 RX ORDER — WARFARIN SODIUM 5 MG/1
TABLET ORAL
Qty: 7 TAB | Refills: 0 | Status: SHIPPED | OUTPATIENT
Start: 2019-05-08 | End: 2019-05-15 | Stop reason: SDUPTHER

## 2019-05-14 ENCOUNTER — TELEPHONE ANTICOAG (OUTPATIENT)
Dept: CARDIOLOGY CLINIC | Age: 54
End: 2019-05-14

## 2019-05-14 LAB
INR PPP: 2 (ref 0.8–1.2)
INR, EXTERNAL: 2 (ref 2–3)
PROTHROMBIN TIME: 20.1 SEC (ref 9.1–12)

## 2019-05-15 DIAGNOSIS — I48.91 ATRIAL FIBRILLATION, UNSPECIFIED TYPE (HCC): ICD-10-CM

## 2019-05-15 RX ORDER — WARFARIN SODIUM 5 MG/1
TABLET ORAL
Qty: 30 TAB | Refills: 1 | Status: SHIPPED | OUTPATIENT
Start: 2019-05-15 | End: 2019-07-11 | Stop reason: SDUPTHER

## 2019-06-01 DIAGNOSIS — I10 ESSENTIAL HYPERTENSION, BENIGN: ICD-10-CM

## 2019-06-03 RX ORDER — LABETALOL 200 MG/1
TABLET, FILM COATED ORAL
Qty: 180 TAB | Refills: 1 | Status: SHIPPED | OUTPATIENT
Start: 2019-06-03 | End: 2019-07-27 | Stop reason: SDUPTHER

## 2019-06-06 ENCOUNTER — OFFICE VISIT (OUTPATIENT)
Dept: CARDIOLOGY CLINIC | Age: 54
End: 2019-06-06

## 2019-06-06 VITALS
DIASTOLIC BLOOD PRESSURE: 88 MMHG | BODY MASS INDEX: 39.14 KG/M2 | SYSTOLIC BLOOD PRESSURE: 138 MMHG | HEART RATE: 98 BPM | HEIGHT: 74 IN | WEIGHT: 305 LBS | OXYGEN SATURATION: 96 % | RESPIRATION RATE: 18 BRPM

## 2019-06-06 DIAGNOSIS — I48.20 CHRONIC ATRIAL FIBRILLATION (HCC): Primary | ICD-10-CM

## 2019-06-06 DIAGNOSIS — R94.31 ABNORMAL EKG: ICD-10-CM

## 2019-06-06 DIAGNOSIS — I10 ESSENTIAL HYPERTENSION: ICD-10-CM

## 2019-06-06 RX ORDER — DILTIAZEM HYDROCHLORIDE 120 MG/1
120 CAPSULE, COATED, EXTENDED RELEASE ORAL DAILY
Qty: 30 CAP | Refills: 12 | Status: SHIPPED | OUTPATIENT
Start: 2019-06-06 | End: 2020-04-21

## 2019-06-06 NOTE — PROGRESS NOTES
Chief Complaint   Patient presents with    Irregular Heart Beat    Hypertension    Cholesterol Problem     Visit Vitals  /88 (BP 1 Location: Left arm, BP Patient Position: Sitting)   Pulse 98   Resp 18   Ht 6' 2\" (1.88 m)   Wt 305 lb (138.3 kg)   SpO2 96%   BMI 39.16 kg/m²     Chest pain denied  SOB denied  Dizziness denied  Swelling strained ankle in right leg  Recent hospital visit denied  Refills denied

## 2019-06-06 NOTE — PROGRESS NOTES
Kylie aSntos MD. [facility] [place]              Patient: Agus Morrow  : 1965      Today's Date: 2019            HISTORY OF PRESENT ILLNESS:     History of Present Illness:  Here for follow-up. Doing OK overall. Had to get off of ACE-I due to angioedema - but he thinks it may have been due to muscle relaxant given same day. No CP or much SOB -- although did have JOSUE with stress test today. PAST MEDICAL HISTORY:     Past Medical History:   Diagnosis Date    Atrial fibrillation ([address] 02    Hypertension     Hypertensive Heart Disease    Long term (current) use of anticoagulants     Obesity     JING (obstructive sleep apnea) 07    Rx C-PAP             MEDICATIONS:     Current Outpatient Medications   Medication Sig Dispense Refill    labetalol (NORMODYNE) 200 mg tablet TAKE TWO TABLETS BY MOUTH TWICE PER  Tab 1    warfarin (COUMADIN) 5 mg tablet TAKE 1 TABLET BY MOUTH DAILY. OR AS DIRECTED BY COUMADIN CLINIC. 30 Tab 1    amLODIPine (NORVASC) 5 mg tablet Take 1 Tab by mouth daily. 90 Tab 2    triamterene-hydroCHLOROthiazide (MAXZIDE) 75-50 mg per tablet TAKE 1 TAB BY MOUTH DAILY. 90 Tab 2    multivitamin (ONE A DAY) tablet Take 1 Tab by mouth daily.  cpap machine kit by Does Not Apply route. 1 Kit 0       Allergies   Allergen Reactions    Enalapril Angioedema    Shellfish Containing Products Swelling             SOCIAL HISTORY:     Social History     Tobacco Use    Smoking status: Never Smoker    Smokeless tobacco: Never Used   Substance Use Topics    Alcohol use: Yes     Alcohol/week: 3.6 oz     Types: 6 Cans of beer per week     Comment: up to 6 beers/week    Drug use: No         FAMILY HISTORY:     Family History   Problem Relation Age of Onset    Coronary Artery Disease Mother     Heart Attack Mother              REVIEW OF SYSTEMS:        Review of Systems:  Constitutional: Negative for fever, chills  HEENT: Negative for vision changes.    Respiratory: Negative for cough  Cardiovascular: Negative for orthopnea, syncope, and PND.    Gastrointestinal: Negative for abdominal pain, diarrhea, or melena  Genitourinary: Negative for dysuria  Musculoskeletal: Negative for myalgias.    Skin: Negative for rash  Heme: No problems bleeding. Neurological: Negative for speech change and focal weakness.                   PHYSICAL EXAM:        Physical Exam:   Visit Vitals  /88 (BP 1 Location: Left arm, BP Patient Position: Sitting)   Pulse 98   Resp 18   Ht 6' 2\" (1.88 m)   Wt 305 lb (138.3 kg)   SpO2 96%   BMI 39.16 kg/m²          Patient appears generally well, mood and affect are appropriate and pleasant.  + obese  HEENT:  Hearing intact, non-icteric, normocephalic, atraumatic.    Neck Exam: Supple, no bruits   Lung Exam: Clear to auscultation, even breath sounds.    Cardiac Exam: Irregularly, irregular with no murmur - distant   Abdomen: Soft, non-tender,  + obese  Extremities: MAW, No pitting lower extremity edema. Psych: Appropriate affect  Neuro - Grossly intact              LABS / OTHER STUDIES:          Lab Results   Component Value Date/Time    Sodium 142 05/19/2018 12:06 PM    Potassium 4.4 05/19/2018 12:06 PM    Chloride 98 05/19/2018 12:06 PM    CO2 23 05/19/2018 12:06 PM    Anion gap 6 11/03/2009 02:59 PM    Glucose 77 05/19/2018 12:06 PM    BUN 13 05/19/2018 12:06 PM    Creatinine 0.98 05/19/2018 12:06 PM    BUN/Creatinine ratio 13 05/19/2018 12:06 PM    GFR est  05/19/2018 12:06 PM    GFR est non-AA 88 05/19/2018 12:06 PM    Calcium 9.2 05/19/2018 12:06 PM    Bilirubin, total 1.6 (H) 07/11/2017 10:14 AM    AST (SGOT) 20 07/11/2017 10:14 AM    Alk.  phosphatase 56 07/11/2017 10:14 AM    Protein, total 7.4 07/11/2017 10:14 AM    Albumin 4.3 07/11/2017 10:14 AM    Globulin 3.6 11/03/2009 02:59 PM    A-G Ratio 1.4 07/11/2017 10:14 AM    ALT (SGPT) 17 07/11/2017 10:14 AM     Lab Results   Component Value Date/Time    WBC 4.2 05/19/2018 12:06 PM    HGB 12.7 (L) 05/19/2018 12:06 PM    HCT 37.0 (L) 05/19/2018 12:06 PM    PLATELET 263 33/23/6010 12:06 PM    MCV 90 05/19/2018 12:06 PM       Lab Results   Component Value Date/Time    Cholesterol, total 167 07/11/2017 10:14 AM    HDL Cholesterol 44 07/11/2017 10:14 AM    LDL, calculated 106 (H) 07/11/2017 10:14 AM    VLDL, calculated 17 07/11/2017 10:14 AM    Triglyceride 85 07/11/2017 10:14 AM           CARDIAC DIAGNOSTICS:        Cardiac Evaluation Includes:  Echo 4/14 - TDS, Definity, LVEF 50%, LAE  Treadmill Stress Test 10/22/15 - walked 6 min (7 METS), no ischemic EKG changes or chest pain, he was in Afib reaching 103% MPHR (he had his labetalol this AM)  Echo 6/27/16 - TDS. Definity. LVEF 50%. RV normal. Mod LAE.  Mild MR  Treadmill Stress Test 7/26/17 - walked 4 min (7.0 METS), normal study      Treadmill stress test 6/6/19 - EKG with Afib, HR, 105, PRWP at start of study --  walked 6 min (7.5 METS), normal stress test         EKG 4/21/15 - Afib, PVC's, possible anteroseptal infarct age undetermined (PRWP)  EKG 4/28/16 - Afib, HR 80, possible anteroseptal infarct age undetermined, non-specific T wave abn  EKG 2/7/17 - Atrial fibrillation, PVC, Cannot rule out Lateral infarct  Old, PRWP, RAD  EKG 5/3/18 - Afib, PRWP, PVC              ASSESSMENT AND PLAN:        Assessment and Plan:  1) Chronic Afib  - continue rate control and anticoagulation (he prefers coumadin over NOAC's)   - On 6/6/19 - HR fast on labetalol (Fast during the stress test even though he took labetalol this morning) --> will add cardizem to labetalol to see if HR better (will stop Norvasc) ---> see NP in a couple of weeks to assess HR and BP control         2) HTN  - BP is borderline high - 133/83 or so at home  - Goal BP at least < 135/85 at home   - Switch from Norvasc to Cardizem as above       3) Morbid Obesity   - in 2019 he has lost 50 lbs   - continue to work weight loss       4) Pre-participation stress test for the [employer] in 7/17 was normal  - he can participate from my standpoint   - he will need stress test every 2 years       5) Abnormal EKG - PRWP   - Denies major cardiac complaints  - Recheck an echo in 2-3 weeks       6) See NP in 2-3 weeks. See me in 12 months.  Patient expressed understanding of the plan - questions were answered.      He is [employer] fan.  Lives with his girl friend.    [address] Jessica Gil MD, [address] [place] [address], [address]      [address] [place] [address]. [address] [place] [address], [address] [place] [address]  Ph: [phone]                               Ph [phone]       ADDENDUM   6/30/2019  Echo 6/26/19 - TDS. Definity. Mild LV dilation. LVEF 55%. Mod LAE. Will have NP call with results.

## 2019-06-23 LAB
INR PPP: 1.9 (ref 0.8–1.2)
PROTHROMBIN TIME: 18.8 SEC (ref 9.1–12)

## 2019-06-26 ENCOUNTER — OFFICE VISIT (OUTPATIENT)
Dept: CARDIOLOGY CLINIC | Age: 54
End: 2019-06-26

## 2019-06-26 VITALS
HEIGHT: 74 IN | BODY MASS INDEX: 39.25 KG/M2 | WEIGHT: 305.8 LBS | HEART RATE: 80 BPM | RESPIRATION RATE: 18 BRPM | SYSTOLIC BLOOD PRESSURE: 138 MMHG | DIASTOLIC BLOOD PRESSURE: 86 MMHG

## 2019-06-26 DIAGNOSIS — G47.33 OSA ON CPAP: ICD-10-CM

## 2019-06-26 DIAGNOSIS — R94.31 ABNORMAL EKG: ICD-10-CM

## 2019-06-26 DIAGNOSIS — I10 ESSENTIAL HYPERTENSION: ICD-10-CM

## 2019-06-26 DIAGNOSIS — Z99.89 OSA ON CPAP: ICD-10-CM

## 2019-06-26 DIAGNOSIS — I48.21 PERMANENT ATRIAL FIBRILLATION (HCC): Primary | ICD-10-CM

## 2019-06-26 DIAGNOSIS — Z79.01 CHRONIC ANTICOAGULATION: ICD-10-CM

## 2019-06-26 NOTE — PROGRESS NOTES
YOUGN Giordano  [id number]  [place], [address] [facility]    Office [phone]  Fax [phone]        Farooq Leong is a 49 y.o. male who is followed outpatient by Dr. Felipa Reynolds and was last seen 6/6/19. Patient is here today for follow up of BP and HR. Assessment  Encounter Diagnoses   Name Primary?  Permanent atrial fibrillation (HCC) Yes    Essential hypertension     Abnormal EKG     JING on CPAP     Chronic anticoagulation      Recommendations:  Chronic Afib  - continue rate control and anticoagulation (he prefers coumadin over NOAC's)   - HR well controlled on labetalol and diltiazem     HTN  - BP OK, <135/85 at home  - cont current meds      Morbid Obesity   - in 2019 he has lost 50 lbs   - continue to work weight loss        Abnormal EKG   - Denies major cardiac complaints  - echo today (results pending)    F/u with Dr. Felipa Reynolds in 6 months or PRN. Phone f/u to review echo.       Patient understands the plan. All questions were answered to the patient's satisfaction.     Medication Side Effects and Warnings were discussed with patient: yes  Patient Labs were reviewed and or requested:  yes  Patient Past Records were reviewed and or requested: yes     I appreciate the opportunity to be involved in patient's care. Please do not hesitate to contact us with questions or concerns. Subjective:  Here for follow-up after several medication changes last visit. States BP actually improved, now low 130s/80s consistently. Continues to work on weight loss and works our regularly 3x per week.       Patient denies any exertional chest pain, dyspnea, palpitations, syncope, orthopnea, edema or paroxysmal nocturnal dyspnea. Uses CPAP with good compliance.       Cardiac testing  Echo 4/14 - TDS, Definity, LVEF 50%, LAE  Treadmill Stress Test 10/22/15 - walked 6 min (7 METS), no ischemic EKG changes or chest pain, he was in Afib reaching 103% MPHR (he had his labetalol this AM)  Echo 6/27/16 - TDS. Definity. LVEF 50%. RV normal. Mod LAE. Mild MR  Treadmill Stress Test 7/26/17 - walked 4 min (7.0 METS), normal study      Treadmill stress test 6/6/19 - EKG with Afib, HR, 105, PRWP at start of study --  walked 6 min (7.5 METS), normal stress test      EKG 4/21/15 - Afib, PVC's, possible anteroseptal infarct age undetermined (PRWP)  EKG 4/28/16 - Afib, HR 80, possible anteroseptal infarct age undetermined, non-specific T wave abn  EKG 2/7/17 - Atrial fibrillation, PVC, Cannot rule out Lateral infarct  Old, PRWP, RAD  EKG 5/3/18 - Afib, PRWP, PVC    Past Medical History:   Diagnosis Date    Atrial fibrillation ([facility] [address] 4/11/02    Hypertension     Hypertensive Heart Disease    Long term (current) use of anticoagulants     Obesity     JING (obstructive sleep apnea) 12/14/07    Rx C-PAP        Current Outpatient Medications   Medication Sig Dispense Refill    dilTIAZem CD (CARDIZEM CD) 120 mg ER capsule Take 1 Cap by mouth daily. 30 Cap 12    labetalol (NORMODYNE) 200 mg tablet TAKE TWO TABLETS BY MOUTH TWICE PER  Tab 1    warfarin (COUMADIN) 5 mg tablet TAKE 1 TABLET BY MOUTH DAILY. OR AS DIRECTED BY COUMADIN CLINIC. 30 Tab 1    triamterene-hydroCHLOROthiazide (MAXZIDE) 75-50 mg per tablet TAKE 1 TAB BY MOUTH DAILY. 90 Tab 2    multivitamin (ONE A DAY) tablet Take 1 Tab by mouth daily.  cpap machine kit by Does Not Apply route. 1 Kit 0       Allergies   Allergen Reactions    Enalapril Angioedema    Shellfish Containing Products Swelling          Review of Systems  Constitutional: Negative for fever, chills, malaise/fatigue and diaphoresis. Respiratory: Negative for cough, hemoptysis, sputum production, shortness of breath and wheezing. Cardiovascular: Negative for chest pain, palpitations, orthopnea, claudication, leg swelling and PND. Gastrointestinal: Negative for heartburn, nausea, vomiting, blood in stool and melena.   Genitourinary: Negative for dysuria and flank pain. Neurological: Negative for focal weakness, seizures, loss of consciousness, weakness and headaches. Endo/Heme/Allergies: Negative for abnormal bleeding. Psychiatric/Behavioral: Negative for memory loss.        Physical Exam    Visit Vitals  /86 (BP 1 Location: Left arm) Comment: per echo tech   Pulse 80   Resp 18   Ht 6' 2\" (1.88 m)   Wt 305 lb 12.8 oz (138.7 kg)   BMI 39.26 kg/m²     Wt Readings from Last 3 Encounters:   06/26/19 305 lb 12.8 oz (138.7 kg)   06/26/19 305 lb (138.3 kg)   06/06/19 305 lb (138.3 kg)      Patient appears generally well, mood and affect are appropriate and pleasant.  + obese  HEENT:  Hearing intact, non-icteric, normocephalic, atraumatic.    Neck Exam: Supple,    Lung Exam: Clear to auscultation, even breath sounds.    Cardiac Exam: Irregularly, irregular with no murmur - distant heart sounds  Abdomen: Soft, non-tender,  + obese  Extremities: no CHAD  Psych: Appropriate affect  Neuro - Grossly intact    Labs  No results found for: HBA1C, HGBE8, RPF1RKPU, JKC0IRUM, ANR3ACPW    Lab Results   Component Value Date/Time    Sodium 142 05/19/2018 12:06 PM    Sodium 140 07/11/2017 10:14 AM    Sodium 140 07/23/2016 11:25 AM    Sodium 140 04/28/2016 09:37 AM    Sodium 141 08/22/2015 11:25 AM    Potassium 4.4 05/19/2018 12:06 PM    Chloride 98 05/19/2018 12:06 PM    Chloride 100 07/11/2017 10:14 AM    Chloride 101 07/23/2016 11:25 AM    Chloride 99 04/28/2016 09:37 AM    Chloride 101 08/22/2015 11:25 AM    CO2 23 05/19/2018 12:06 PM    CO2 24 07/11/2017 10:14 AM    CO2 21 07/23/2016 11:25 AM    CO2 27 04/28/2016 09:37 AM    CO2 23 08/22/2015 11:25 AM    Anion gap 6 11/03/2009 02:59 PM    Glucose 77 05/19/2018 12:06 PM    Glucose 119 (H) 07/11/2017 10:14 AM    Glucose 102 (H) 07/23/2016 11:25 AM    Glucose 128 (H) 04/28/2016 09:37 AM    Glucose 99 08/22/2015 11:25 AM    BUN 13 05/19/2018 12:06 PM    BUN 17 07/11/2017 10:14 AM    BUN 13 07/23/2016 11:25 AM    BUN 14 04/28/2016 09:37 AM    BUN 13 08/22/2015 11:25 AM    Creatinine 0.98 05/19/2018 12:06 PM    Creatinine 1.02 07/11/2017 10:14 AM    Creatinine 0.87 07/23/2016 11:25 AM    Creatinine 0.92 04/28/2016 09:37 AM    Creatinine 0.89 08/22/2015 11:25 AM    BUN/Creatinine ratio 13 05/19/2018 12:06 PM    BUN/Creatinine ratio 17 07/11/2017 10:14 AM    BUN/Creatinine ratio 15 07/23/2016 11:25 AM    BUN/Creatinine ratio 15 04/28/2016 09:37 AM    BUN/Creatinine ratio 15 08/22/2015 11:25 AM    GFR est  05/19/2018 12:06 PM    GFR est AA 98 07/11/2017 10:14 AM    GFR est  07/23/2016 11:25 AM    GFR est  04/28/2016 09:37 AM    GFR est  08/22/2015 11:25 AM    GFR est non-AA 88 05/19/2018 12:06 PM    GFR est non-AA 85 07/11/2017 10:14 AM    GFR est non- 07/23/2016 11:25 AM    GFR est non-AA 97 04/28/2016 09:37 AM    GFR est non- 08/22/2015 11:25 AM    Calcium 9.2 05/19/2018 12:06 PM    Calcium 9.4 07/11/2017 10:14 AM    Calcium 9.4 07/23/2016 11:25 AM    Calcium 9.3 04/28/2016 09:37 AM    Calcium 9.2 08/22/2015 11:25 AM     Lab Results   Component Value Date/Time    Cholesterol, total 167 07/11/2017 10:14 AM    Cholesterol, total 163 07/23/2016 11:25 AM    Cholesterol, total 159 08/22/2015 11:25 AM    HDL Cholesterol 44 07/11/2017 10:14 AM    HDL Cholesterol 41 07/23/2016 11:25 AM    HDL Cholesterol 43 08/22/2015 11:25 AM    LDL, calculated 106 (H) 07/11/2017 10:14 AM    LDL, calculated 103 (H) 07/23/2016 11:25 AM    LDL, calculated 105 (H) 08/22/2015 11:25 AM    Triglyceride 85 07/11/2017 10:14 AM    Triglyceride 94 07/23/2016 11:25 AM    Triglyceride 57 08/22/2015 11:25 AM     YOUNG Giordano

## 2019-06-26 NOTE — PROGRESS NOTES
Visit Vitals  /86 (BP 1 Location: Left arm) Comment: per echo tech   Pulse 80   Resp 18   Ht 6' 2\" (1.88 m)   Wt 305 lb 12.8 oz (138.7 kg)   BMI 39.26 kg/m²       Patient is here for follow up. Had an echo today. No complaints.

## 2019-06-26 NOTE — PATIENT INSTRUCTIONS
Continue current meds; I will call you with echo results. For now, schedule follow-up to see Dr. Saira Kearns in 6 months.

## 2019-07-03 ENCOUNTER — TELEPHONE (OUTPATIENT)
Dept: CARDIOLOGY CLINIC | Age: 54
End: 2019-07-03

## 2019-07-03 NOTE — TELEPHONE ENCOUNTER
----- Message from Myles Vanessa MD sent at 6/30/2019 10:46 PM EDT -----  Regarding: please call   Can you please call pt with results     Echo 6/26/19 - TDS. Definity. Mild LV dilation. LVEF 55%. Mod LAE.      Thanks,  SK

## 2019-07-03 NOTE — TELEPHONE ENCOUNTER
Patient states he was speaking to Jatinder Lowry NP but the call was disconnected. He is attempting to reach her again.      Phone: [phone]

## 2019-07-11 DIAGNOSIS — I48.91 ATRIAL FIBRILLATION, UNSPECIFIED TYPE (HCC): ICD-10-CM

## 2019-07-12 RX ORDER — WARFARIN SODIUM 5 MG/1
TABLET ORAL
Qty: 30 TAB | Refills: 1 | Status: SHIPPED | OUTPATIENT
Start: 2019-07-12 | End: 2019-08-05 | Stop reason: SDUPTHER

## 2019-07-22 ENCOUNTER — TELEPHONE (OUTPATIENT)
Dept: CARDIOLOGY CLINIC | Age: 54
End: 2019-07-22

## 2019-07-22 NOTE — TELEPHONE ENCOUNTER
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     Phone: [phone]

## 2019-07-22 NOTE — TELEPHONE ENCOUNTER
Returned phone call,   Pt requested the results from his recent echo, PT/INR for work. He stated he would pick them up after Wednesday at [facility] location.

## 2019-07-27 DIAGNOSIS — I10 ESSENTIAL HYPERTENSION, BENIGN: ICD-10-CM

## 2019-08-03 RX ORDER — LABETALOL 200 MG/1
TABLET, FILM COATED ORAL
Qty: 360 TAB | Refills: 1 | Status: SHIPPED | OUTPATIENT
Start: 2019-08-03 | End: 2020-01-28

## 2019-08-05 DIAGNOSIS — I48.91 ATRIAL FIBRILLATION, UNSPECIFIED TYPE (HCC): ICD-10-CM

## 2019-08-07 RX ORDER — WARFARIN SODIUM 5 MG/1
TABLET ORAL
Qty: 30 TAB | Refills: 1 | Status: SHIPPED | OUTPATIENT
Start: 2019-08-07 | End: 2019-09-04 | Stop reason: SDUPTHER

## 2019-08-16 DIAGNOSIS — I10 ESSENTIAL HYPERTENSION, BENIGN: ICD-10-CM

## 2019-08-19 RX ORDER — TRIAMTERENE AND HYDROCHLOROTHIAZIDE 75; 50 MG/1; MG/1
TABLET ORAL
Qty: 90 TAB | Refills: 2 | Status: SHIPPED | OUTPATIENT
Start: 2019-08-19 | End: 2020-05-12

## 2019-08-20 ENCOUNTER — TELEPHONE (OUTPATIENT)
Dept: CARDIOLOGY CLINIC | Age: 54
End: 2019-08-20

## 2019-08-20 LAB
INR PPP: 2.5 (ref 0.8–1.2)
PROTHROMBIN TIME: 24.2 SEC (ref 9.1–12)

## 2019-08-21 NOTE — TELEPHONE ENCOUNTER
Fax received from [facility]:  INR: 2.5 (High)  PT: 24.2 (High)    Please advise  Date collected: 8/19/19  Date reported 8/20/19

## 2019-09-04 DIAGNOSIS — I48.91 ATRIAL FIBRILLATION, UNSPECIFIED TYPE (HCC): ICD-10-CM

## 2019-09-10 RX ORDER — WARFARIN SODIUM 5 MG/1
TABLET ORAL
Qty: 30 TAB | Refills: 1 | Status: SHIPPED | OUTPATIENT
Start: 2019-09-10 | End: 2019-09-23 | Stop reason: SDUPTHER

## 2019-09-22 LAB
INR PPP: 1.7 (ref 0.8–1.2)
PROTHROMBIN TIME: 17.4 SEC (ref 9.1–12)

## 2019-09-23 ENCOUNTER — TELEPHONE (OUTPATIENT)
Dept: CARDIOLOGY CLINIC | Age: 54
End: 2019-09-23

## 2019-09-23 DIAGNOSIS — I48.91 ATRIAL FIBRILLATION, UNSPECIFIED TYPE (HCC): ICD-10-CM

## 2019-09-23 RX ORDER — WARFARIN SODIUM 5 MG/1
TABLET ORAL
Qty: 90 TAB | Refills: 1 | Status: SHIPPED | OUTPATIENT
Start: 2019-09-23 | End: 2020-05-04 | Stop reason: SDUPTHER

## 2019-09-23 NOTE — TELEPHONE ENCOUNTER
Last appt: Visit date not found  Future Appointments   Date Time Provider Lui Gandara   1/2/2020  4:20 PM Freda Parry MD [address] [facility]       Requested Prescriptions     Pending Prescriptions Disp Refills    warfarin (COUMADIN) 5 mg tablet 90 Tab 1     Sig: TAKE 1 TABLET BY MOUTH DAILY. OR AS DIRECTED BY COUMADIN CLINIC.       Requesting 90 day supply

## 2019-09-23 NOTE — TELEPHONE ENCOUNTER
States he missed one coumadin dose last week. States he missed a coumadin dose llast week. Today,per verbal order from NEGIN Chavarria to take coumadin dose of 7.5mg.

## 2019-10-27 LAB
INR PPP: 1.7 (ref 0.8–1.2)
PROTHROMBIN TIME: 16.7 SEC (ref 9.1–12)

## 2019-12-28 LAB
INR PPP: 1.7 (ref 0.8–1.2)
PROTHROMBIN TIME: 17 SEC (ref 9.1–12)

## 2020-01-24 DIAGNOSIS — I10 ESSENTIAL HYPERTENSION, BENIGN: ICD-10-CM

## 2020-01-28 RX ORDER — LABETALOL 200 MG/1
TABLET, FILM COATED ORAL
Qty: 360 TAB | Refills: 1 | Status: SHIPPED | OUTPATIENT
Start: 2020-01-28 | End: 2020-08-03

## 2020-01-28 NOTE — TELEPHONE ENCOUNTER
Per Dr. Isabela Jauregui VO:  ZVQ:8/97/96  Future Appointments   Date Time Provider Lui Nichol   2/6/2020  4:00 PM Amira Bateman MD [address] [facility]     Requested Prescriptions     Pending Prescriptions Disp Refills    labetaloL (NORMODYNE) 200 mg tablet [Pharmacy Med Name: LABETALOL  MG TABLET] 360 Tab 1     Sig: TAKE 2 TABLETS BY MOUTH TWICE A DAY

## 2020-02-02 LAB
INR PPP: 2 (ref 0.8–1.2)
PROTHROMBIN TIME: 19.4 SEC (ref 9.1–12)

## 2020-02-06 ENCOUNTER — OFFICE VISIT (OUTPATIENT)
Dept: CARDIOLOGY CLINIC | Age: 55
End: 2020-02-06

## 2020-02-06 VITALS
RESPIRATION RATE: 16 BRPM | HEART RATE: 78 BPM | SYSTOLIC BLOOD PRESSURE: 152 MMHG | DIASTOLIC BLOOD PRESSURE: 98 MMHG | BODY MASS INDEX: 40.43 KG/M2 | OXYGEN SATURATION: 98 % | WEIGHT: 315 LBS | HEIGHT: 74 IN

## 2020-02-06 DIAGNOSIS — I48.21 PERMANENT ATRIAL FIBRILLATION (HCC): ICD-10-CM

## 2020-02-06 DIAGNOSIS — I10 ESSENTIAL HYPERTENSION: Primary | ICD-10-CM

## 2020-02-06 NOTE — PROGRESS NOTES
Chief Complaint   Patient presents with    Follow-up    Hypertension    Irregular Heart Beat     Visit Vitals  BP (!) 152/98 (BP 1 Location: Left arm, BP Patient Position: Sitting)   Pulse 78   Resp 16   Ht 6' 2\" (1.88 m)   Wt 328 lb 3.2 oz (148.9 kg)   SpO2 98%   BMI 42.14 kg/m²     1. Have you been to the ER, urgent care clinic since your last visit? Hospitalized since your last visit?no    2. Have you seen or consulted any other health care providers outside of the [address] [place] [address] since your last visit? Include any pap smears or colon screening.  no

## 2020-02-06 NOTE — PROGRESS NOTES
Justino Gracia MD. [facility]              Patient: Rigo Nava  : 1965      Today's Date: 2020              HISTORY OF PRESENT ILLNESS:     History of Present Illness:  Here for follow-up. Under some stress with family. He feels well. Admits he could eat better and exercise more. No CP or SOB or dizziness. /87 or so at home. PAST MEDICAL HISTORY:     Past Medical History:   Diagnosis Date    Atrial fibrillation ([address] 02    Hypertension     Hypertensive Heart Disease    Long term (current) use of anticoagulants     Obesity     JING (obstructive sleep apnea) 07    Rx C-PAP           MEDICATIONS:     Current Outpatient Medications   Medication Sig Dispense Refill    labetaloL (NORMODYNE) 200 mg tablet TAKE 2 TABLETS BY MOUTH TWICE A  Tab 1    warfarin (COUMADIN) 5 mg tablet TAKE 1 TABLET BY MOUTH DAILY. OR AS DIRECTED BY COUMADIN CLINIC. 90 Tab 1    triamterene-hydroCHLOROthiazide (MAXZIDE) 75-50 mg per tablet TAKE 1 TABLET BY MOUTH EVERY DAY 90 Tab 2    dilTIAZem CD (CARDIZEM CD) 120 mg ER capsule Take 1 Cap by mouth daily. 30 Cap 12    multivitamin (ONE A DAY) tablet Take 1 Tab by mouth daily.  cpap machine kit by Does Not Apply route. 1 Kit 0       Allergies   Allergen Reactions    Enalapril Angioedema    Shellfish Containing Products Swelling             SOCIAL HISTORY:     Social History     Tobacco Use    Smoking status: Never Smoker    Smokeless tobacco: Never Used   Substance Use Topics    Alcohol use:  Yes     Alcohol/week: 6.0 standard drinks     Types: 6 Cans of beer per week     Comment: up to 6 beers/week    Drug use: No         FAMILY HISTORY:     Family History   Problem Relation Age of Onset    Coronary Artery Disease Mother     Heart Attack Mother              REVIEW OF SYSTEMS:        Review of Systems:  Constitutional: Negative for fever, chills  HEENT: Negative for vision changes.    Respiratory: Negative for cough  Cardiovascular: Negative for orthopnea, syncope, and PND.    Gastrointestinal: Negative for abdominal pain, diarrhea, or melena  Genitourinary: Negative for dysuria  Musculoskeletal: Negative for myalgias.    Skin: Negative for rash  Heme: No problems bleeding. Neurological: Negative for speech change and focal weakness.                   PHYSICAL EXAM:        Physical Exam:   Visit Vitals  BP (!) 152/98 (BP 1 Location: Left arm, BP Patient Position: Sitting)   Pulse 78   Resp 16   Ht 6' 2\" (1.88 m)   Wt 328 lb 3.2 oz (148.9 kg)   SpO2 98%   BMI 42.14 kg/m²          Patient appears generally well, mood and affect are appropriate and pleasant.  + obese  HEENT:  Hearing intact, non-icteric, normocephalic, atraumatic.    Neck Exam: Supple, no bruits   Lung Exam: Clear to auscultation, even breath sounds.    Cardiac Exam: Irregularly, irregular with no murmur - distant   Abdomen: Soft, non-tender,  + obese  Extremities: MAW, No pitting lower extremity edema. Psych: Appropriate affect  Neuro - Grossly intact              LABS / OTHER STUDIES:          Lab Results   Component Value Date/Time    Sodium 142 05/19/2018 12:06 PM    Potassium 4.4 05/19/2018 12:06 PM    Chloride 98 05/19/2018 12:06 PM    CO2 23 05/19/2018 12:06 PM    Anion gap 6 11/03/2009 02:59 PM    Glucose 77 05/19/2018 12:06 PM    BUN 13 05/19/2018 12:06 PM    Creatinine 0.98 05/19/2018 12:06 PM    BUN/Creatinine ratio 13 05/19/2018 12:06 PM    GFR est  05/19/2018 12:06 PM    GFR est non-AA 88 05/19/2018 12:06 PM    Calcium 9.2 05/19/2018 12:06 PM    Bilirubin, total 1.6 (H) 07/11/2017 10:14 AM    AST (SGOT) 20 07/11/2017 10:14 AM    Alk.  phosphatase 56 07/11/2017 10:14 AM    Protein, total 7.4 07/11/2017 10:14 AM    Albumin 4.3 07/11/2017 10:14 AM    Globulin 3.6 11/03/2009 02:59 PM    A-G Ratio 1.4 07/11/2017 10:14 AM    ALT (SGPT) 17 07/11/2017 10:14 AM       Lab Results   Component Value Date/Time    WBC 4.2 05/19/2018 12:06 PM    HGB 12.7 (L) 05/19/2018 12:06 PM    HCT 37.0 (L) 05/19/2018 12:06 PM    PLATELET 424 48/86/9581 12:06 PM    MCV 90 05/19/2018 12:06 PM       Lab Results   Component Value Date/Time    Cholesterol, total 167 07/11/2017 10:14 AM    HDL Cholesterol 44 07/11/2017 10:14 AM    LDL, calculated 106 (H) 07/11/2017 10:14 AM    VLDL, calculated 17 07/11/2017 10:14 AM    Triglyceride 85 07/11/2017 10:14 AM               CARDIAC DIAGNOSTICS:        Cardiac Evaluation Includes:  Echo 4/14 - TDS, Definity, LVEF 50%, LAE  Treadmill Stress Test 10/22/15 - walked 6 min (7 METS), no ischemic EKG changes or chest pain, he was in Afib reaching 103% MPHR (he had his labetalol this AM)  Echo 6/27/16 - TDS. Definity. LVEF 50%. RV normal. Mod LAE. Mild MR  Treadmill Stress Test 7/26/17 - walked 4 min (7.0 METS), normal study      Treadmill stress test 6/6/19 - EKG with Afib, HR, 105, PRWP at start of study --  walked 6 min (7.5 METS), normal stress test     Echo 6/26/19 - TDS. Definity. Mild LV dilation. LVEF 55%.   Mod LAE.          EKG 4/21/15 - Afib, PVC's, possible anteroseptal infarct age undetermined (PRWP)  EKG 4/28/16 - Afib, HR 80, possible anteroseptal infarct age undetermined, non-specific T wave abn  EKG 2/7/17 - Atrial fibrillation, PVC, Cannot rule out Lateral infarct  Old, PRWP, RAD  EKG 5/3/18 - Afib, PRWP, PVC              ASSESSMENT AND PLAN:        Assessment and Plan:  1) Chronic Afib  - continue rate control and anticoagulation (he prefers coumadin over NOAC's)   - HR OK  - cont labetalol and cardizem       2) HTN  - BP is mildly high at home   - Goal BP at least < 135/85 at home ideally   - He says he wants to work on diet and exercise before adding any more meds       3) Morbid Obesity   - in 2019 he has lost 50 lbs then gained 20 lbs back   - continue to work weight loss       4) 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   Patient expressed understanding of the plan - questions were answered.      He is [employer] fan.  Lives with his girl friend.    [address], [address] [place], [address]      [id number]  [address]  [facility], [address] [place] [address]  Ph: [phone]                               [phone]

## 2020-04-21 RX ORDER — DILTIAZEM HYDROCHLORIDE 120 MG/1
CAPSULE, COATED, EXTENDED RELEASE ORAL
Qty: 90 CAP | Refills: 4 | Status: SHIPPED | OUTPATIENT
Start: 2020-04-21 | End: 2021-07-13

## 2020-05-04 DIAGNOSIS — I48.91 ATRIAL FIBRILLATION, UNSPECIFIED TYPE (HCC): ICD-10-CM

## 2020-05-04 RX ORDER — WARFARIN SODIUM 5 MG/1
TABLET ORAL
Qty: 90 TAB | Refills: 1 | Status: SHIPPED | OUTPATIENT
Start: 2020-05-04 | End: 2020-11-05

## 2020-05-04 NOTE — TELEPHONE ENCOUNTER
Per Dr. Fritz Mireles VO:  [id number]:3/2/7113  Future Appointments   Date Time Provider Lui Nichol   7/9/2020  9:20 AM [facility]     Requested Prescriptions     Signed Prescriptions Disp Refills    warfarin (COUMADIN) 5 mg tablet 90 Tab 1     Sig: TAKE 1 TABLET BY MOUTH DAILY. OR AS DIRECTED BY COUMADIN CLINIC.      Authorizing Provider: Josey Logan     Ordering User: Pranav Ashby

## 2020-05-12 DIAGNOSIS — I10 ESSENTIAL HYPERTENSION, BENIGN: ICD-10-CM

## 2020-05-12 RX ORDER — TRIAMTERENE AND HYDROCHLOROTHIAZIDE 75; 50 MG/1; MG/1
TABLET ORAL
Qty: 90 TAB | Refills: 2 | Status: SHIPPED | OUTPATIENT
Start: 2020-05-12 | End: 2021-02-05

## 2020-06-21 LAB
INR PPP: 1.9 (ref 0.8–1.2)
PROTHROMBIN TIME: 19.2 SEC (ref 9.1–12)

## 2020-06-22 ENCOUNTER — TELEPHONE ANTICOAG (OUTPATIENT)
Dept: CARDIOLOGY CLINIC | Age: 55
End: 2020-06-22

## 2020-06-22 LAB — INR, EXTERNAL: 1.9 (ref 2–3)

## 2020-07-09 ENCOUNTER — OFFICE VISIT (OUTPATIENT)
Dept: CARDIOLOGY CLINIC | Age: 55
End: 2020-07-09

## 2020-07-09 ENCOUNTER — ANTI-COAG VISIT (OUTPATIENT)
Dept: CARDIOLOGY CLINIC | Age: 55
End: 2020-07-09

## 2020-07-09 VITALS
SYSTOLIC BLOOD PRESSURE: 140 MMHG | BODY MASS INDEX: 40.43 KG/M2 | HEIGHT: 74 IN | WEIGHT: 315 LBS | DIASTOLIC BLOOD PRESSURE: 90 MMHG | HEART RATE: 69 BPM | OXYGEN SATURATION: 97 %

## 2020-07-09 DIAGNOSIS — Z79.01 ANTICOAGULATED ON COUMADIN: Primary | ICD-10-CM

## 2020-07-09 DIAGNOSIS — I10 ESSENTIAL HYPERTENSION, BENIGN: ICD-10-CM

## 2020-07-09 DIAGNOSIS — I48.21 PERMANENT ATRIAL FIBRILLATION (HCC): Primary | ICD-10-CM

## 2020-07-09 DIAGNOSIS — E78.5 DYSLIPIDEMIA: ICD-10-CM

## 2020-07-09 LAB — INR, EXTERNAL: 1.8 (ref 2–3)

## 2020-07-09 NOTE — PROGRESS NOTES
Zoya Cloud is a 48 y.o. male    Visit Vitals  /90 (BP 1 Location: Left arm, BP Patient Position: Sitting)   Pulse 69   Ht 6' 2\" (1.88 m)   Wt 321 lb (145.6 kg)   SpO2 97%   BMI 41.21 kg/m²       Chief Complaint   Patient presents with    Hypertension    Other     PAF       Chest pain no  SOB   Dizziness no  Swelling no  Recent hospital visit no  Refills no

## 2020-07-09 NOTE — PROGRESS NOTES
Selvin Meyer MD. [facility] [place]              Patient: Brandon Cochran  : 1965      Today's Date: 2020              HISTORY OF PRESENT ILLNESS:     History of Present Illness:  Here for follow-up. He is doing OK. Denies specific problems. No CP or SOB. No bleeding. /85 or so at home. PAST MEDICAL HISTORY:     Past Medical History:   Diagnosis Date    Atrial fibrillation ([address] 02    Hypertension     Hypertensive Heart Disease    Long term (current) use of anticoagulants     Obesity     JING (obstructive sleep apnea) 07    Rx C-PAP           MEDICATIONS:     Current Outpatient Medications   Medication Sig Dispense Refill    triamterene-hydroCHLOROthiazide (MAXZIDE) 75-50 mg per tablet TAKE 1 TABLET BY MOUTH EVERY DAY 90 Tab 2    warfarin (COUMADIN) 5 mg tablet TAKE 1 TABLET BY MOUTH DAILY. OR AS DIRECTED BY COUMADIN CLINIC. 90 Tab 1    dilTIAZem CD (CARDIZEM CD) 120 mg ER capsule TAKE 1 CAPSULE BY MOUTH EVERY DAY 90 Cap 4    labetaloL (NORMODYNE) 200 mg tablet TAKE 2 TABLETS BY MOUTH TWICE A  Tab 1    multivitamin (ONE A DAY) tablet Take 1 Tab by mouth daily.  cpap machine kit by Does Not Apply route. 1 Kit 0       Allergies   Allergen Reactions    Enalapril Angioedema    Shellfish Containing Products Swelling           SOCIAL HISTORY:     Social History     Tobacco Use    Smoking status: Never Smoker    Smokeless tobacco: Never Used   Substance Use Topics    Alcohol use:  Yes     Alcohol/week: 6.0 standard drinks     Types: 6 Cans of beer per week     Comment: up to 6 beers/week    Drug use: No         FAMILY HISTORY:     Family History   Problem Relation Age of Onset    Coronary Artery Disease Mother     Heart Attack Mother            REVIEW OF SYSTEMS:        Review of Systems:  Constitutional: Negative for fever, chills  HEENT: Negative for vision changes.    Respiratory: Negative for cough  Cardiovascular: Negative for orthopnea, syncope, and PND.    Gastrointestinal: Negative for abdominal pain, diarrhea, or melena  Genitourinary: Negative for dysuria  Musculoskeletal: Negative for myalgias.    Skin: Negative for rash  Heme: No problems bleeding. Neurological: Negative for speech change and focal weakness.                   PHYSICAL EXAM:        Physical Exam:   Visit Vitals  /90 (BP 1 Location: Left arm, BP Patient Position: Sitting)   Pulse 69   Ht 6' 2\" (1.88 m)   Wt 321 lb (145.6 kg)   SpO2 97%   BMI 41.21 kg/m²          Patient appears generally well, mood and affect are appropriate and pleasant.  + obese  HEENT:  Hearing intact, non-icteric, normocephalic, atraumatic.    Neck Exam: Supple, no bruits   Lung Exam: Clear to auscultation, even breath sounds.    Cardiac Exam: Irregularly, irregular with no murmur - distant   Abdomen: Soft, non-tender,  + obese  Extremities: MAW, No pitting lower extremity edema. Vascular: 2+ DP's   Psych: Appropriate affect  Neuro - Grossly intact              LABS / OTHER STUDIES:       Lab Results   Component Value Date/Time    Sodium 142 05/19/2018 12:06 PM    Potassium 4.4 05/19/2018 12:06 PM    Chloride 98 05/19/2018 12:06 PM    CO2 23 05/19/2018 12:06 PM    Anion gap 6 11/03/2009 02:59 PM    Glucose 77 05/19/2018 12:06 PM    BUN 13 05/19/2018 12:06 PM    Creatinine 0.98 05/19/2018 12:06 PM    BUN/Creatinine ratio 13 05/19/2018 12:06 PM    GFR est  05/19/2018 12:06 PM    GFR est non-AA 88 05/19/2018 12:06 PM    Calcium 9.2 05/19/2018 12:06 PM    Bilirubin, total 1.6 (H) 07/11/2017 10:14 AM    Alk.  phosphatase 56 07/11/2017 10:14 AM    Protein, total 7.4 07/11/2017 10:14 AM    Albumin 4.3 07/11/2017 10:14 AM    Globulin 3.6 11/03/2009 02:59 PM    A-G Ratio 1.4 07/11/2017 10:14 AM    ALT (SGPT) 17 07/11/2017 10:14 AM    AST (SGOT) 20 07/11/2017 10:14 AM     Lab Results   Component Value Date/Time    WBC 4.2 05/19/2018 12:06 PM    HGB 12.7 (L) 05/19/2018 12:06 PM    HCT 37.0 (L) 05/19/2018 12:06 PM PLATELET 222 05/68/1284 12:06 PM    MCV 90 05/19/2018 12:06 PM       Lab Results   Component Value Date/Time    Cholesterol, total 167 07/11/2017 10:14 AM    HDL Cholesterol 44 07/11/2017 10:14 AM    LDL, calculated 106 (H) 07/11/2017 10:14 AM    VLDL, calculated 17 07/11/2017 10:14 AM    Triglyceride 85 07/11/2017 10:14 AM       Lab Results   Component Value Date/Time    TSH 0.982 08/22/2015 11:25 AM                    CARDIAC DIAGNOSTICS:        Cardiac Evaluation Includes:  Echo 4/14 - TDS, Definity, LVEF 50%, LAE  Treadmill Stress Test 10/22/15 - walked 6 min (7 METS), no ischemic EKG changes or chest pain, he was in Afib reaching 103% MPHR (he had his labetalol this AM)  Echo 6/27/16 - TDS. Definity. LVEF 50%. RV normal. Mod LAE. Mild MR  Treadmill Stress Test 7/26/17 - walked 4 min (7.0 METS), normal study      Treadmill stress test 6/6/19 - EKG with Afib, HR, 105, PRWP at start of study --  walked 6 min (7.5 METS), normal stress test      Echo 6/26/19 - TDS.  [place] LV dilation. LVEF 55%.  Mod LAE.          EKG 4/21/15 - Afib, PVC's, possible anteroseptal infarct age undetermined (PRWP)  EKG 4/28/16 - Afib, HR 80, possible anteroseptal infarct age undetermined, non-specific T wave abn  EKG 2/7/17 - Atrial fibrillation, PVC, Cannot rule out Lateral infarct  Old, PRWP, RAD  EKG 5/3/18 - Afib, PRWP, PVC  EKG 7/9/20 - Afib, old anterior infarct               ASSESSMENT AND PLAN:        Assessment and Plan:  1) Chronic Afib  - continue rate control and anticoagulation (he prefers coumadin over NOAC's)   - HR OK  - cont labetalol and cardizem       2) HTN  - BP is mildly high at home - says it is around 135/85 at home   - Goal BP at least < 135/85 at home ideally   - I recommended we increase Cardizem to 180 mg but he wants to keep meds as is and just work on diet and exercise for better BP control -- says he will increase meds next time if needed.        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   Patient expressed understanding of the plan - questions were answered.      He is [employer] fan.  Lives with his girl friend.    [address] [place], [address] [place], [address]      [id number] [address]  [address]  [facility], [address] [place] [address]  Ph: [phone]                                [phone]

## 2020-07-17 ENCOUNTER — TELEPHONE (OUTPATIENT)
Dept: CARDIOLOGY CLINIC | Age: 55
End: 2020-07-17

## 2020-07-17 NOTE — TELEPHONE ENCOUNTER
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    Phone: [phone]

## 2020-07-19 LAB
ALBUMIN SERPL-MCNC: 4.5 G/DL (ref 3.8–4.9)
ALBUMIN/GLOB SERPL: 1.4 {RATIO} (ref 1.2–2.2)
ALP SERPL-CCNC: 56 IU/L (ref 39–117)
ALT SERPL-CCNC: 23 IU/L (ref 0–44)
AST SERPL-CCNC: 23 IU/L (ref 0–40)
BASOPHILS # BLD AUTO: 0 X10E3/UL (ref 0–0.2)
BASOPHILS NFR BLD AUTO: 1 %
BILIRUB SERPL-MCNC: 1.3 MG/DL (ref 0–1.2)
BUN SERPL-MCNC: 14 MG/DL (ref 6–24)
BUN/CREAT SERPL: 14 (ref 9–20)
CALCIUM SERPL-MCNC: 9.4 MG/DL (ref 8.7–10.2)
CHLORIDE SERPL-SCNC: 102 MMOL/L (ref 96–106)
CHOLEST SERPL-MCNC: 152 MG/DL (ref 100–199)
CO2 SERPL-SCNC: 23 MMOL/L (ref 20–29)
CREAT SERPL-MCNC: 1.01 MG/DL (ref 0.76–1.27)
EOSINOPHIL # BLD AUTO: 0.2 X10E3/UL (ref 0–0.4)
EOSINOPHIL NFR BLD AUTO: 3 %
ERYTHROCYTE [DISTWIDTH] IN BLOOD BY AUTOMATED COUNT: 13.9 % (ref 11.6–15.4)
GLOBULIN SER CALC-MCNC: 3.2 G/DL (ref 1.5–4.5)
GLUCOSE SERPL-MCNC: 111 MG/DL (ref 65–99)
HCT VFR BLD AUTO: 41.3 % (ref 37.5–51)
HDLC SERPL-MCNC: 47 MG/DL
HGB BLD-MCNC: 13.7 G/DL (ref 13–17.7)
IMM GRANULOCYTES # BLD AUTO: 0 X10E3/UL (ref 0–0.1)
IMM GRANULOCYTES NFR BLD AUTO: 0 %
INTERPRETATION, 910389: NORMAL
LDLC SERPL CALC-MCNC: 90 MG/DL (ref 0–99)
LYMPHOCYTES # BLD AUTO: 1.2 X10E3/UL (ref 0.7–3.1)
LYMPHOCYTES NFR BLD AUTO: 20 %
MCH RBC QN AUTO: 30.6 PG (ref 26.6–33)
MCHC RBC AUTO-ENTMCNC: 33.2 G/DL (ref 31.5–35.7)
MCV RBC AUTO: 92 FL (ref 79–97)
MONOCYTES # BLD AUTO: 0.4 X10E3/UL (ref 0.1–0.9)
MONOCYTES NFR BLD AUTO: 6 %
NEUTROPHILS # BLD AUTO: 4.2 X10E3/UL (ref 1.4–7)
NEUTROPHILS NFR BLD AUTO: 70 %
PLATELET # BLD AUTO: 165 X10E3/UL (ref 150–450)
POTASSIUM SERPL-SCNC: 3.4 MMOL/L (ref 3.5–5.2)
PROT SERPL-MCNC: 7.7 G/DL (ref 6–8.5)
RBC # BLD AUTO: 4.47 X10E6/UL (ref 4.14–5.8)
SODIUM SERPL-SCNC: 142 MMOL/L (ref 134–144)
TRIGL SERPL-MCNC: 74 MG/DL (ref 0–149)
VLDLC SERPL CALC-MCNC: 15 MG/DL (ref 5–40)
WBC # BLD AUTO: 6 X10E3/UL (ref 3.4–10.8)

## 2020-07-20 ENCOUNTER — TELEPHONE (OUTPATIENT)
Dept: CARDIOLOGY CLINIC | Age: 55
End: 2020-07-20

## 2020-07-20 NOTE — TELEPHONE ENCOUNTER
Spoke to pt, he has his DOT physical tomorrow and is requesting copies of his INR, stress test, EKG left behind the desk at [facility].

## 2020-07-30 DIAGNOSIS — I10 ESSENTIAL HYPERTENSION, BENIGN: ICD-10-CM

## 2020-08-03 RX ORDER — LABETALOL 200 MG/1
400 TABLET, FILM COATED ORAL 2 TIMES DAILY
Qty: 360 TAB | Refills: 1 | Status: SHIPPED | OUTPATIENT
Start: 2020-08-03 | End: 2021-01-30

## 2020-08-03 NOTE — TELEPHONE ENCOUNTER
Per Dr. Gareth Diaz VO:  LOV:Visit date not found  Future Appointments   Date Time Provider Lui Nichol   1/8/2021  9:00 AM NIEVES YOUNG   1/8/2021  9:40 AM MD VEENA Timmons     Requested Prescriptions     Pending Prescriptions Disp Refills    labetaloL (NORMODYNE) 200 mg tablet [Pharmacy Med Name: LABETALOL  MG TABLET] 360 Tab 1     Sig: TAKE 2 TABLETS BY MOUTH TWICE A DAY

## 2020-08-16 LAB
INR PPP: 1.6 (ref 0.8–1.2)
PROTHROMBIN TIME: 17.2 SEC (ref 9.1–12)

## 2020-09-20 LAB
INR PPP: 1.8 (ref 0.8–1.2)
PROTHROMBIN TIME: 18.9 SEC (ref 9.1–12)

## 2020-09-21 ENCOUNTER — TELEPHONE ANTICOAG (OUTPATIENT)
Dept: CARDIOLOGY CLINIC | Age: 55
End: 2020-09-21

## 2020-09-21 LAB — INR, EXTERNAL: 1.8 (ref 2–3)

## 2020-10-27 DIAGNOSIS — I48.91 ATRIAL FIBRILLATION, UNSPECIFIED TYPE (HCC): ICD-10-CM

## 2020-11-02 NOTE — TELEPHONE ENCOUNTER
Per Dr. Phillip Ibarra VO:  TOX:3/3/9236  Future Appointments   Date Time Provider Lui Gandara   11/5/2020  4:20 PM MD VEENA Diaz BS AMB   1/8/2021  9:00 AM NIEVES YOUNG BS AMB   1/8/2021  9:40 AM MD VEENA Diaz BS AMB     Requested Prescriptions     Pending Prescriptions Disp Refills    warfarin (COUMADIN) 5 mg tablet [Pharmacy Med Name: WARFARIN SODIUM 5 MG TABLET] 90 Tab 1     Sig: TAKE 1 TABLET BY MOUTH DAILY. OR AS DIRECTED BY COUMADIN CLINIC.

## 2020-11-05 ENCOUNTER — OFFICE VISIT (OUTPATIENT)
Dept: CARDIOLOGY CLINIC | Age: 55
End: 2020-11-05
Payer: COMMERCIAL

## 2020-11-05 VITALS
BODY MASS INDEX: 39.91 KG/M2 | HEIGHT: 74 IN | DIASTOLIC BLOOD PRESSURE: 78 MMHG | SYSTOLIC BLOOD PRESSURE: 138 MMHG | OXYGEN SATURATION: 98 % | WEIGHT: 311 LBS | HEART RATE: 73 BPM

## 2020-11-05 DIAGNOSIS — I10 ESSENTIAL HYPERTENSION: ICD-10-CM

## 2020-11-05 DIAGNOSIS — I48.21 PERMANENT ATRIAL FIBRILLATION (HCC): Primary | ICD-10-CM

## 2020-11-05 PROCEDURE — 99214 OFFICE O/P EST MOD 30 MIN: CPT | Performed by: SPECIALIST

## 2020-11-05 RX ORDER — LOSARTAN POTASSIUM 50 MG/1
TABLET ORAL DAILY
COMMUNITY
End: 2021-04-15 | Stop reason: SDUPTHER

## 2020-11-05 NOTE — PROGRESS NOTES
Betty Silvestre MD. [facility]              Patient: Michelle Fajardo  : 1965      Today's Date: 2020          HISTORY OF PRESENT ILLNESS:     History of Present Illness:  Comes in since BP has been up - 158/80 or so --> Dr. Jimbo Rucker started losartan -- since then running 135/78 or so. No CP or SOB. No lightheadedness. Feels OK otherwise. PAST MEDICAL HISTORY:     Past Medical History:   Diagnosis Date    Atrial fibrillation ([address] 02    Hypertension     Hypertensive Heart Disease    Long term (current) use of anticoagulants     Obesity     JING (obstructive sleep apnea) 07    Rx C-PAP         MEDICATIONS:     Current Outpatient Medications   Medication Sig Dispense Refill    losartan (COZAAR) 50 mg tablet Take  by mouth daily.  labetaloL (NORMODYNE) 200 mg tablet Take 2 Tabs by mouth two (2) times a day. 360 Tab 1    triamterene-hydroCHLOROthiazide (MAXZIDE) 75-50 mg per tablet TAKE 1 TABLET BY MOUTH EVERY DAY 90 Tab 2    warfarin (COUMADIN) 5 mg tablet TAKE 1 TABLET BY MOUTH DAILY. OR AS DIRECTED BY COUMADIN CLINIC. 90 Tab 1    dilTIAZem CD (CARDIZEM CD) 120 mg ER capsule TAKE 1 CAPSULE BY MOUTH EVERY DAY 90 Cap 4    multivitamin (ONE A DAY) tablet Take 1 Tab by mouth daily.  cpap machine kit by Does Not Apply route. 1 Kit 0       Allergies   Allergen Reactions    Enalapril Angioedema    Shellfish Containing Products Swelling           SOCIAL HISTORY:     Social History     Tobacco Use    Smoking status: Never Smoker    Smokeless tobacco: Never Used   Substance Use Topics    Alcohol use:  Yes     Alcohol/week: 6.0 standard drinks     Types: 6 Cans of beer per week     Comment: up to 6 beers/week    Drug use: No         FAMILY HISTORY:     Family History   Problem Relation Age of Onset    Coronary Artery Disease Mother     Heart Attack Mother            REVIEW OF SYSTEMS:        Review of Systems:  Constitutional: Negative for fever, chills  HEENT: Negative for vision changes.    Respiratory: Negative for cough  Cardiovascular: Negative for orthopnea, syncope, and PND.    Gastrointestinal: Negative for abdominal pain, diarrhea, or melena  Genitourinary: Negative for dysuria  Musculoskeletal: Negative for myalgias.    Skin: Negative for rash  Heme: No problems bleeding. Neurological: Negative for speech change and focal weakness.                   PHYSICAL EXAM:        Physical Exam:   Visit Vitals  /78 (BP 1 Location: Left arm, BP Patient Position: Sitting)   Pulse 73   Ht 6' 2\" (1.88 m)   Wt 311 lb (141.1 kg)   SpO2 98%   BMI 39.93 kg/m²          Patient appears generally well, mood and affect are appropriate and pleasant.  + obese  HEENT:  Hearing intact, non-icteric, normocephalic, atraumatic.    Neck Exam: Supple, no bruits   Lung Exam: Clear to auscultation, even breath sounds.    Cardiac Exam: Irregularly, irregular with no murmur - distant   Abdomen: Soft, non-tender,  + obese  Extremities: MAW, No pitting lower extremity edema. No sig rashes   Psych: Appropriate affect  Neuro - Grossly intact              LABS / OTHER STUDIES:       Lab Results   Component Value Date/Time    Sodium 142 07/18/2020 07:46 AM    Potassium 3.4 (L) 07/18/2020 07:46 AM    Chloride 102 07/18/2020 07:46 AM    CO2 23 07/18/2020 07:46 AM    Anion gap 6 11/03/2009 02:59 PM    Glucose 111 (H) 07/18/2020 07:46 AM    BUN 14 07/18/2020 07:46 AM    Creatinine 1.01 07/18/2020 07:46 AM    BUN/Creatinine ratio 14 07/18/2020 07:46 AM    GFR est AA 97 07/18/2020 07:46 AM    GFR est non-AA 84 07/18/2020 07:46 AM    Calcium 9.4 07/18/2020 07:46 AM    Bilirubin, total 1.3 (H) 07/18/2020 07:46 AM    Alk.  phosphatase 56 07/18/2020 07:46 AM    Protein, total 7.7 07/18/2020 07:46 AM    Albumin 4.5 07/18/2020 07:46 AM    Globulin 3.6 11/03/2009 02:59 PM    A-G Ratio 1.4 07/18/2020 07:46 AM    ALT (SGPT) 23 07/18/2020 07:46 AM    AST (SGOT) 23 07/18/2020 07:46 AM     Lab Results   Component Value Date/Time    WBC 6.0 07/18/2020 07:46 AM    HGB 13.7 07/18/2020 07:46 AM    HCT 41.3 07/18/2020 07:46 AM    PLATELET 605 67/86/6423 07:46 AM    MCV 92 07/18/2020 07:46 AM       Lab Results   Component Value Date/Time    Cholesterol, total 152 07/18/2020 07:46 AM    HDL Cholesterol 47 07/18/2020 07:46 AM    LDL, calculated 90 07/18/2020 07:46 AM    VLDL, calculated 15 07/18/2020 07:46 AM    Triglyceride 74 07/18/2020 07:46 AM                   CARDIAC DIAGNOSTICS:        Cardiac Evaluation Includes:  Echo 4/14 - TDS, Definity, LVEF 50%, LAE  Treadmill Stress Test 10/22/15 - walked 6 min (7 METS), no ischemic EKG changes or chest pain, he was in Afib reaching 103% MPHR (he had his labetalol this AM)  Echo 6/27/16 - TDS. Definity. LVEF 50%. RV normal. Mod LAE. Mild MR  Treadmill Stress Test 7/26/17 - walked 4 min (7.0 METS), normal study      Treadmill stress test 6/6/19 - EKG with Afib, HR, 105, PRWP at start of study --  walked 6 min (7.5 METS), normal stress test      Echo 6/26/19 - TDS.  DefinityJolanda Luc[place] LV dilation.  LVEF 55%.  Mod LAE.          EKG 4/21/15 - Afib, PVC's, possible anteroseptal infarct age undetermined (PRWP)  EKG 4/28/16 - Afib, HR 80, possible anteroseptal infarct age undetermined, non-specific T wave abn  EKG 2/7/17 - Atrial fibrillation, PVC, Cannot rule out Lateral infarct  Old, PRWP, RAD  EKG 5/3/18 - Afib, PRWP, PVC  EKG 7/9/20 - Afib, old anterior infarct               ASSESSMENT AND PLAN:        Assessment and Plan:  1) Chronic Afib  - continue rate control and anticoagulation (he prefers coumadin over DOAC's) -- I advised DOAC but he wants to stick with warfarin   - HR OK  - cont labetalol and cardizem       2) HTN  - Goal BP at least < 135/85 at home ideally  - On 11/5/20 - BP about 135/78 at home  - discussed options and he would like to avoid increasing meds -- prefers lifestyle changes first - will continue meds as is and follow BP     3) Morbid Obesity   - in 2019 he has lost 50 lbs then gained 20 lbs back   - continue to work weight loss       4) 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    Patient expressed understanding of the plan - questions were answered.      He is [employer] fan.  Lives with his girl friend.    [address], [address] [place] [address], [place] [id number] [address]  [address]  [address]  Ph: [phone]                                [phone]

## 2020-11-05 NOTE — PROGRESS NOTES
Stevie Mckeon is a 48 y.o. male    Visit Vitals  /78 (BP 1 Location: Left arm, BP Patient Position: Sitting)   Pulse 73   Ht 6' 2\" (1.88 m)   Wt 311 lb (141.1 kg)   SpO2 98%   BMI 39.93 kg/m²       Chief Complaint   Patient presents with    Hypertension    Irregular Heart Beat     PAF    Other     DLD       Chest pain NO  SOB NO  Dizziness NO  Swelling NO  Recent hospital visit NO  Refills NO

## 2020-11-06 RX ORDER — WARFARIN SODIUM 5 MG/1
TABLET ORAL
Qty: 30 TAB | Refills: 0 | Status: SHIPPED | OUTPATIENT
Start: 2020-11-06 | End: 2020-12-10

## 2020-11-17 ENCOUNTER — TELEPHONE (OUTPATIENT)
Dept: CARDIOLOGY CLINIC | Age: 55
End: 2020-11-17

## 2020-11-17 DIAGNOSIS — Z79.01 ANTICOAGULATED ON COUMADIN: Primary | ICD-10-CM

## 2020-11-18 NOTE — TELEPHONE ENCOUNTER
Per Dr. Roark Klinefelter: \"Needs it checked at least once a month if INR is stable. \"    Called pt,  He requested the lab orders be faxed to the [facility] location.   Fax [phone]

## 2020-11-28 LAB
INR PPP: 1.9 (ref 0.9–1.2)
PROTHROMBIN TIME: 19.8 SEC (ref 9.1–12)

## 2020-12-01 ENCOUNTER — TELEPHONE (OUTPATIENT)
Dept: CARDIOLOGY CLINIC | Age: 55
End: 2020-12-01

## 2020-12-01 NOTE — TELEPHONE ENCOUNTER
Patient stated that he had his INR checked on Saturday. Please advise.     Phone #: [phone]  Thanks

## 2020-12-04 DIAGNOSIS — I48.91 ATRIAL FIBRILLATION, UNSPECIFIED TYPE (HCC): ICD-10-CM

## 2020-12-04 NOTE — TELEPHONE ENCOUNTER
Refill per VO of Dr. Marilee Santos:  Last appt: 11/5/2020  Future Appointments   Date Time Provider Lui Gandara   1/8/2021  9:00 AM NIEVES YOUNG   1/8/2021  9:40 AM MD VEENA Ortiz BS AMB       Requested Prescriptions     Pending Prescriptions Disp Refills    warfarin (COUMADIN) 5 mg tablet 30 Tab 0

## 2020-12-09 DIAGNOSIS — I48.91 ATRIAL FIBRILLATION, UNSPECIFIED TYPE (HCC): ICD-10-CM

## 2020-12-10 RX ORDER — WARFARIN SODIUM 5 MG/1
TABLET ORAL
Qty: 35 TAB | Refills: 1 | Status: SHIPPED | OUTPATIENT
Start: 2020-12-10 | End: 2021-02-03

## 2020-12-10 RX ORDER — WARFARIN SODIUM 5 MG/1
TABLET ORAL
Qty: 90 TAB | Refills: 2 | Status: CANCELLED
Start: 2020-12-10

## 2020-12-10 NOTE — TELEPHONE ENCOUNTER
Refill per VO of Dr. Leon Reach:  Last appt: 7/9/2020  Future Appointments   Date Time Provider Lui Gandara   1/8/2021  9:00 AM NIEVES YOUNG   1/8/2021  9:40 AM MD VEENA Frey BS AMB       Requested Prescriptions     Signed Prescriptions Disp Refills    warfarin (COUMADIN) 5 mg tablet 35 Tab 1     Sig: TAKE 1 TABLET BY MOUTH DAILY except on Tue, take 2 tabs. OR AS DIRECTED BY COUMADIN CLINIC.      Authorizing Provider: Prescott Moritz     Ordering User: Mai Murphy

## 2020-12-31 LAB
INR PPP: 2.3 (ref 0.9–1.2)
PROTHROMBIN TIME: 23.9 SEC (ref 9.1–12)

## 2021-01-28 DIAGNOSIS — I10 ESSENTIAL HYPERTENSION, BENIGN: ICD-10-CM

## 2021-01-30 RX ORDER — LABETALOL 200 MG/1
TABLET, FILM COATED ORAL
Qty: 360 TAB | Refills: 1 | Status: SHIPPED | OUTPATIENT
Start: 2021-01-30 | End: 2021-07-27

## 2021-01-30 NOTE — TELEPHONE ENCOUNTER
Refill per VO of Dr. Arian Iverson:  Last appt: 11/5/20  Future Appointments   Date Time Provider Lui Nichol   4/1/2021  1:00 PM NIEVES YOUNG   4/1/2021  1:40 PM MD VEENA Victor AMB       Requested Prescriptions     Signed Prescriptions Disp Refills    labetaloL (NORMODYNE) 200 mg tablet 360 Tab 1     Sig: TAKE 2 TABLETS BY MOUTH 2 TIMES DAILY     Authorizing Provider: Mario Apley     Ordering User: Brock Viera

## 2021-01-31 DIAGNOSIS — I48.91 ATRIAL FIBRILLATION, UNSPECIFIED TYPE (HCC): ICD-10-CM

## 2021-02-03 RX ORDER — WARFARIN SODIUM 5 MG/1
TABLET ORAL
Qty: 35 TAB | Refills: 1 | Status: SHIPPED | OUTPATIENT
Start: 2021-02-03 | End: 2021-03-04 | Stop reason: SDUPTHER

## 2021-02-03 NOTE — TELEPHONE ENCOUNTER
Refill per VO of Dr. Bradshaw Person:  Last appt: 11/5/2020  Future Appointments   Date Time Provider Lui Hilliardi   4/1/2021  1:00 PM NIEVES YOUNG   4/1/2021  1:40 PM MD VEENA Ordonez BS AMB       Requested Prescriptions     Signed Prescriptions Disp Refills    warfarin (COUMADIN) 5 mg tablet 35 Tab 1     Sig: TAKE 1 TABLET BY MOUTH DAILY EXCEPT ON TUE, TAKE 2 TABS. OR AS DIRECTED BY COUMADIN CLINIC.      Authorizing Provider: Susana Paz     Ordering User: Rodrigo Boyle

## 2021-02-05 DIAGNOSIS — I10 ESSENTIAL HYPERTENSION, BENIGN: ICD-10-CM

## 2021-02-05 RX ORDER — TRIAMTERENE AND HYDROCHLOROTHIAZIDE 75; 50 MG/1; MG/1
TABLET ORAL
Qty: 90 TAB | Refills: 2 | Status: SHIPPED | OUTPATIENT
Start: 2021-02-05 | End: 2021-11-10

## 2021-02-22 ENCOUNTER — TELEPHONE (OUTPATIENT)
Dept: CARDIOLOGY CLINIC | Age: 56
End: 2021-02-22

## 2021-02-22 DIAGNOSIS — I48.21 PERMANENT ATRIAL FIBRILLATION (HCC): ICD-10-CM

## 2021-02-22 DIAGNOSIS — Z79.01 ANTICOAGULATED ON COUMADIN: Primary | ICD-10-CM

## 2021-02-22 NOTE — TELEPHONE ENCOUNTER
Patient states he needs a standing order for INR and PT to be sent to [facility] at the [facility].  Phone: [phone] Fax: [phone]

## 2021-02-28 LAB
INR PPP: 2.1 (ref 0.9–1.2)
PROTHROMBIN TIME: 21.4 SEC (ref 9.1–12)

## 2021-03-04 DIAGNOSIS — I48.91 ATRIAL FIBRILLATION, UNSPECIFIED TYPE (HCC): ICD-10-CM

## 2021-03-05 RX ORDER — WARFARIN SODIUM 5 MG/1
TABLET ORAL
Qty: 105 TAB | Refills: 1 | Status: SHIPPED | OUTPATIENT
Start: 2021-03-05 | End: 2021-09-27

## 2021-03-05 NOTE — TELEPHONE ENCOUNTER
Refill per VO of Dr. Gin García:  Last appt: 11/5/2020  Future Appointments   Date Time Provider Lui Hilliardi   4/1/2021  1:00 PM NIEVES YOUNG   4/1/2021  1:40 PM MD VEENA Myers BS AMB       Requested Prescriptions     Signed Prescriptions Disp Refills    warfarin (COUMADIN) 5 mg tablet 105 Tab 1     Sig: TAKE 1 TABLET BY MOUTH DAILY EXCEPT ON TUE, TAKE 2 TABS.  OR AS DIRECTED BY COUMADIN CLINIC     Authorizing Provider: Fadi Smith     Ordering User: Olivia Reyes

## 2021-04-11 LAB
INR PPP: 2.1 (ref 0.9–1.2)
PROTHROMBIN TIME: 22.3 SEC (ref 9.1–12)

## 2021-04-16 RX ORDER — LOSARTAN POTASSIUM 50 MG/1
50 TABLET ORAL DAILY
Qty: 90 TAB | Refills: 0 | Status: SHIPPED | OUTPATIENT
Start: 2021-04-16 | End: 2021-05-19 | Stop reason: SDUPTHER

## 2021-04-16 NOTE — TELEPHONE ENCOUNTER
Refill per VO of Dr. Barber Rash:  Last appt: 11/5/2020  Future Appointments   Date Time Provider Lui Hilliardi   5/19/2021  3:00 PM NIEVES TURNER   5/19/2021  4:00 PM MD VEENA Lennon BS AMB       Requested Prescriptions     Signed Prescriptions Disp Refills    losartan (COZAAR) 50 mg tablet 90 Tab 0     Sig: Take 1 Tab by mouth daily.      Authorizing Provider: Mihai Trujillo     Ordering User: Tiera Montelongo

## 2021-05-19 ENCOUNTER — OFFICE VISIT (OUTPATIENT)
Dept: CARDIOLOGY CLINIC | Age: 56
End: 2021-05-19

## 2021-05-19 ENCOUNTER — ANCILLARY PROCEDURE (OUTPATIENT)
Dept: CARDIOLOGY CLINIC | Age: 56
End: 2021-05-19
Payer: COMMERCIAL

## 2021-05-19 VITALS
DIASTOLIC BLOOD PRESSURE: 100 MMHG | BODY MASS INDEX: 40.43 KG/M2 | SYSTOLIC BLOOD PRESSURE: 160 MMHG | HEIGHT: 74 IN | WEIGHT: 315 LBS

## 2021-05-19 VITALS
DIASTOLIC BLOOD PRESSURE: 100 MMHG | BODY MASS INDEX: 40.43 KG/M2 | SYSTOLIC BLOOD PRESSURE: 160 MMHG | WEIGHT: 315 LBS | HEART RATE: 69 BPM | HEIGHT: 74 IN

## 2021-05-19 DIAGNOSIS — I48.91 ATRIAL FIBRILLATION, UNSPECIFIED TYPE (HCC): ICD-10-CM

## 2021-05-19 DIAGNOSIS — I10 ESSENTIAL HYPERTENSION: ICD-10-CM

## 2021-05-19 DIAGNOSIS — R94.31 ABNORMAL EKG: ICD-10-CM

## 2021-05-19 DIAGNOSIS — I48.21 PERMANENT ATRIAL FIBRILLATION (HCC): Primary | ICD-10-CM

## 2021-05-19 PROCEDURE — 99214 OFFICE O/P EST MOD 30 MIN: CPT | Performed by: SPECIALIST

## 2021-05-19 PROCEDURE — 93306 TTE W/DOPPLER COMPLETE: CPT | Performed by: SPECIALIST

## 2021-05-19 RX ORDER — LOSARTAN POTASSIUM 100 MG/1
100 TABLET ORAL DAILY
Qty: 90 TABLET | Refills: 3 | Status: SHIPPED | OUTPATIENT
Start: 2021-05-19 | End: 2022-08-01

## 2021-05-19 NOTE — PROGRESS NOTES
Chief Complaint   Patient presents with    Follow-up     6 months; Echo jazmine Belle Senior Irregular Heart Beat    Hypertension     Visit Vitals  BP (!) 160/100 (BP 1 Location: Right upper arm, BP Patient Position: Sitting, BP Cuff Size: Large adult)   Pulse 69   Ht 6' 2\" (1.88 m)   Wt 339 lb 9.6 oz (154 kg)   BMI 43.60 kg/m²     Chest pain denied   SOB denied   Palpitations denied   Swelling in hands/feet denied   Dizziness denied   Recent hospital stays denied   Refills denied     BP has been higher. 140/90, 135-85's at home. Fluctuation quite a bit. Asking about stress test for work (usually ever few years).

## 2021-05-19 NOTE — PROGRESS NOTES
Amador Bishop MD. [facility] [place]              Patient: Jemal Mix  : 1965      Today's Date: 2021          HISTORY OF PRESENT ILLNESS:     History of Present Illness:    BP still a little high at home. He feels fine. No CP or sig SOB. PAST MEDICAL HISTORY:     Past Medical History:   Diagnosis Date    Atrial fibrillation ([address] 02    Hypertension     Hypertensive Heart Disease    Long term (current) use of anticoagulants     Obesity     JING (obstructive sleep apnea) 07    Rx C-PAP         MEDICATIONS:     Current Outpatient Medications   Medication Sig Dispense Refill    losartan (COZAAR) 50 mg tablet Take 1 Tab by mouth daily. 90 Tab 0    warfarin (COUMADIN) 5 mg tablet TAKE 1 TABLET BY MOUTH DAILY EXCEPT ON TUE, TAKE 2 TABS. OR AS DIRECTED BY COUMADIN CLINIC 105 Tab 1    triamterene-hydroCHLOROthiazide (MAXZIDE) 75-50 mg per tablet TAKE 1 TABLET BY MOUTH EVERY DAY 90 Tab 2    labetaloL (NORMODYNE) 200 mg tablet TAKE 2 TABLETS BY MOUTH 2 TIMES DAILY 360 Tab 1    dilTIAZem CD (CARDIZEM CD) 120 mg ER capsule TAKE 1 CAPSULE BY MOUTH EVERY DAY 90 Cap 4    multivitamin (ONE A DAY) tablet Take 1 Tab by mouth daily.  cpap machine kit by Does Not Apply route. 1 Kit 0       Allergies   Allergen Reactions    Enalapril Angioedema    Shellfish Containing Products Swelling             SOCIAL HISTORY:     Social History     Tobacco Use    Smoking status: Never Smoker    Smokeless tobacco: Never Used   Substance Use Topics    Alcohol use:  Yes     Alcohol/week: 6.0 standard drinks     Types: 6 Cans of beer per week     Comment: up to 6 beers/week    Drug use: No         FAMILY HISTORY:     Family History   Problem Relation Age of Onset    Coronary Artery Disease Mother     Heart Attack Mother            REVIEW OF SYSTEMS:        Review of Systems:  Constitutional: Negative for fever, chills  HEENT: Negative for vision changes.    Respiratory: Negative for cough  Cardiovascular: Negative for orthopnea, syncope, and PND.    Gastrointestinal: Negative for abdominal pain, diarrhea, or melena  Genitourinary: Negative for dysuria  Musculoskeletal: Negative for myalgias.    Skin: Negative for rash  Heme: No problems bleeding. Neurological: Negative for speech change and focal weakness.                   PHYSICAL EXAM:        Physical Exam:   Visit Vitals  BP (!) 160/100 (BP 1 Location: Right upper arm, BP Patient Position: Sitting, BP Cuff Size: Large adult)   Pulse 69   Ht 6' 2\" (1.88 m)   Wt 339 lb 9.6 oz (154 kg)   BMI 43.60 kg/m²          Patient appears generally well, mood and affect are appropriate and pleasant.  + obese  HEENT:  Hearing intact, non-icteric, normocephalic, atraumatic.    Neck Exam: Supple, no bruits   Lung Exam: Clear to auscultation, even breath sounds.    Cardiac Exam: Irregularly, irregular with no murmur - distant   Abdomen: Soft, non-tender,  + obese  Extremities: MAW, No pitting lower extremity edema. No sig rashes   Psych: Appropriate affect  Neuro - Grossly intact              LABS / OTHER STUDIES:         Lab Results   Component Value Date/Time    Sodium 142 07/18/2020 07:46 AM    Potassium 3.4 (L) 07/18/2020 07:46 AM    Chloride 102 07/18/2020 07:46 AM    CO2 23 07/18/2020 07:46 AM    Anion gap 6 11/03/2009 02:59 PM    Glucose 111 (H) 07/18/2020 07:46 AM    BUN 14 07/18/2020 07:46 AM    Creatinine 1.01 07/18/2020 07:46 AM    BUN/Creatinine ratio 14 07/18/2020 07:46 AM    GFR est AA 97 07/18/2020 07:46 AM    GFR est non-AA 84 07/18/2020 07:46 AM    Calcium 9.4 07/18/2020 07:46 AM    Bilirubin, total 1.3 (H) 07/18/2020 07:46 AM    Alk.  phosphatase 56 07/18/2020 07:46 AM    Protein, total 7.7 07/18/2020 07:46 AM    Albumin 4.5 07/18/2020 07:46 AM    Globulin 3.6 11/03/2009 02:59 PM    A-G Ratio 1.4 07/18/2020 07:46 AM    ALT (SGPT) 23 07/18/2020 07:46 AM    AST (SGOT) 23 07/18/2020 07:46 AM     Lab Results   Component Value Date/Time Cholesterol, total 152 07/18/2020 07:46 AM    HDL Cholesterol 47 07/18/2020 07:46 AM    LDL, calculated 90 07/18/2020 07:46 AM    VLDL, calculated 15 07/18/2020 07:46 AM    Triglyceride 74 07/18/2020 07:46 AM     Lab Results   Component Value Date/Time    WBC 6.0 07/18/2020 07:46 AM    HGB 13.7 07/18/2020 07:46 AM    HCT 41.3 07/18/2020 07:46 AM    PLATELET 406 96/11/4439 07:46 AM    MCV 92 07/18/2020 07:46 AM                  CARDIAC DIAGNOSTICS:        Cardiac Evaluation Includes:  Echo 4/14 - TDS, Definity, LVEF 50%, LAE  Treadmill Stress Test 10/22/15 - walked 6 min (7 METS), no ischemic EKG changes or chest pain, he was in Afib reaching 103% MPHR (he had his labetalol this AM)  Echo 6/27/16 - TDS. Definity. LVEF 50%. RV normal. Mod LAE. Mild MR  Treadmill Stress Test 7/26/17 - walked 4 min (7.0 METS), normal study      Treadmill stress test 6/6/19 - EKG with Afib, HR, 105, PRWP at start of study --  walked 6 min (7.5 METS), normal stress test      Echo 6/26/19 - TDS.  DefinityErma Zamudio LV dilation.  LVEF 55%.  Mod LAE.      Echo 5/19/21 - PRELIM - mild-mod MR          EKG 4/21/15 - Afib, PVC's, possible anteroseptal infarct age undetermined (PRWP)  EKG 4/28/16 - Afib, HR 80, possible anteroseptal infarct age undetermined, non-specific T wave abn  EKG 2/7/17 - Atrial fibrillation, PVC, Cannot rule out Lateral infarct  Old, PRWP, RAD  EKG 5/3/18 - Afib, PRWP, PVC  EKG 7/9/20 - Afib, old anterior infarct               ASSESSMENT AND PLAN:        Assessment and Plan:  1) Chronic Afib  - continue rate control and anticoagulation (he prefers coumadin over DOAC's) -- I advised DOAC but he wants to stick with warfarin   - HR OK  - cont labetalol and cardizem       2) HTN  - Goal BP at least < 135/85 at home ideally  - BP remains high --> Will increase losartan to 100 mg daily and continue other meds      3) Morbid Obesity   - in 2019 he has lost 50 lbs then gained 20 lbs back   - continue to work weight loss and consider weight loss surgery       4) 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   Patient expressed understanding of the plan - questions were answered.      He is [employer] fan.  Lives with his girl friend.    [facility] Larry Ramirez MD, [address] [facility], [address] [id number] [address]  [address]  [address]  Ph: [phone]                                [phone]

## 2021-05-20 LAB
ECHO AO ROOT DIAM: 3.35 CM
ECHO AV AREA PEAK VELOCITY: 3.49 CM2
ECHO AV AREA VTI: 3.59 CM2
ECHO AV AREA/BSA PEAK VELOCITY: 1.3 CM2/M2
ECHO AV AREA/BSA VTI: 1.3 CM2/M2
ECHO AV MEAN GRADIENT: 4.17 MMHG
ECHO AV PEAK GRADIENT: 8.29 MMHG
ECHO AV PEAK VELOCITY: 143.99 CM/S
ECHO AV VTI: 26.44 CM
ECHO EST RA PRESSURE: 8 MMHG
ECHO IVC PROX: 2.81 CM
ECHO LA AREA 4C: 48.94 CM2
ECHO LA MAJOR AXIS: 6.27 CM
ECHO LA MINOR AXIS: 2.31 CM
ECHO LA VOL 2C: 190.46 ML (ref 18–58)
ECHO LA VOL 4C: 238.81 ML (ref 18–58)
ECHO LA VOL BP: 229.43 ML (ref 18–58)
ECHO LA VOL/BSA BIPLANE: 84.35 ML/M2 (ref 16–28)
ECHO LA VOLUME INDEX A2C: 70.02 ML/M2 (ref 16–28)
ECHO LA VOLUME INDEX A4C: 87.8 ML/M2 (ref 16–28)
ECHO LV INTERNAL DIMENSION DIASTOLIC MMODE: 6.1 CM
ECHO LV INTERNAL DIMENSION DIASTOLIC: 5.97 CM (ref 4.2–5.9)
ECHO LV INTERNAL DIMENSION SYSTOLIC MMODE: 4.29 CM
ECHO LV INTERNAL DIMENSION SYSTOLIC: 4.05 CM
ECHO LV IVSD: 0.82 CM (ref 0.6–1)
ECHO LV MASS 2D: 212.4 G (ref 88–224)
ECHO LV MASS INDEX 2D: 78.1 G/M2 (ref 49–115)
ECHO LV POSTERIOR WALL DIASTOLIC: 0.97 CM (ref 0.6–1)
ECHO LVOT DIAM: 2.49 CM
ECHO LVOT PEAK GRADIENT: 4.29 MMHG
ECHO LVOT PEAK VELOCITY: 103.59 CM/S
ECHO LVOT SV: 95 ML
ECHO LVOT VTI: 19.57 CM
ECHO MV E VELOCITY: 148.46 CM/S
ECHO MV MAX VELOCITY: 160.37 CM/S
ECHO MV MEAN GRADIENT: 1.56 MMHG
ECHO MV PEAK GRADIENT: 10.29 MMHG
ECHO MV VTI: 38.92 CM
ECHO RA AREA 4C: 35.84 CM2
ECHO RIGHT VENTRICULAR SYSTOLIC PRESSURE (RVSP): 38.98 MMHG
ECHO RV INTERNAL DIMENSION: 5.37 CM
ECHO RV TAPSE: 2.3 CM (ref 1.5–2)
ECHO TV REGURGITANT MAX VELOCITY: 278.29 CM/S
ECHO TV REGURGITANT PEAK GRADIENT: 30.98 MMHG

## 2021-06-06 LAB
INR PPP: 1.9 (ref 0.9–1.2)
PROTHROMBIN TIME: 20.1 SEC (ref 9.1–12)

## 2021-06-28 ENCOUNTER — OFFICE VISIT (OUTPATIENT)
Dept: CARDIOLOGY CLINIC | Age: 56
End: 2021-06-28
Payer: COMMERCIAL

## 2021-06-28 ENCOUNTER — ANCILLARY PROCEDURE (OUTPATIENT)
Dept: CARDIOLOGY CLINIC | Age: 56
End: 2021-06-28

## 2021-06-28 VITALS — BODY MASS INDEX: 40.43 KG/M2 | WEIGHT: 315 LBS | HEIGHT: 74 IN

## 2021-06-28 VITALS
WEIGHT: 315 LBS | BODY MASS INDEX: 40.43 KG/M2 | SYSTOLIC BLOOD PRESSURE: 138 MMHG | HEART RATE: 75 BPM | HEIGHT: 74 IN | DIASTOLIC BLOOD PRESSURE: 86 MMHG | OXYGEN SATURATION: 97 %

## 2021-06-28 DIAGNOSIS — I48.19 PERSISTENT ATRIAL FIBRILLATION (HCC): Primary | ICD-10-CM

## 2021-06-28 DIAGNOSIS — I48.19 PERSISTENT ATRIAL FIBRILLATION (HCC): ICD-10-CM

## 2021-06-28 DIAGNOSIS — I47.29 NSVT (NONSUSTAINED VENTRICULAR TACHYCARDIA): ICD-10-CM

## 2021-06-28 DIAGNOSIS — I10 HYPERTENSION, UNSPECIFIED TYPE: ICD-10-CM

## 2021-06-28 DIAGNOSIS — I10 ESSENTIAL HYPERTENSION: ICD-10-CM

## 2021-06-28 DIAGNOSIS — Z79.01 ANTICOAGULATED ON COUMADIN: ICD-10-CM

## 2021-06-28 LAB
STRESS ANGINA INDEX: 0
STRESS BASELINE DIAS BP: 88 MMHG
STRESS BASELINE HR: 79 BPM
STRESS BASELINE SYS BP: 154 MMHG
STRESS ESTIMATED WORKLOAD: 8.2 METS
STRESS EXERCISE DUR MIN: NORMAL
STRESS O2 SAT PEAK: 97 %
STRESS O2 SAT REST: 98 %
STRESS PEAK DIAS BP: 100 MMHG
STRESS PEAK SYS BP: 168 MMHG
STRESS PERCENT HR ACHIEVED: 112 %
STRESS POST PEAK HR: 184 BPM
STRESS RATE PRESSURE PRODUCT: NORMAL BPM*MMHG
STRESS ST DEPRESSION: 0 MM
STRESS ST ELEVATION: 0 MM
STRESS TARGET HR: 165 BPM

## 2021-06-28 PROCEDURE — 99214 OFFICE O/P EST MOD 30 MIN: CPT | Performed by: SPECIALIST

## 2021-06-28 PROCEDURE — 93015 CV STRESS TEST SUPVJ I&R: CPT | Performed by: SPECIALIST

## 2021-06-28 NOTE — PROGRESS NOTES
Chief Complaint   Patient presents with    Follow-up     1 month; stress test    Irregular Heart Beat    Hypertension     Visit Vitals  /86 (BP 1 Location: Right upper arm, BP Patient Position: Sitting, BP Cuff Size: Large adult)   Pulse 75   Ht 6' 2\" (1.88 m)   Wt 334 lb (151.5 kg)   SpO2 97%   BMI 42.88 kg/m²     Chest pain denied   SOB denied   Palpitations denied   Swelling in hands/feet some days bilat LE  Dizziness denied   Recent hospital stays denied   Refills denied     PT/INR standing order [facility] (Pended)    Orders for See me in one year with an echo    per Dr. Juanpablo Lunsford VO.   Dx: nsvt

## 2021-06-28 NOTE — PROGRESS NOTES
Puja Escobar MD. [facility] [place]              Patient: Arnav Jacob  : 1965      Today's Date: 2021            HISTORY OF PRESENT ILLNESS:     History of Present Illness:  Doing well. No complaints. No CP or SOB. No palpitations. PAST MEDICAL HISTORY:     Past Medical History:   Diagnosis Date    Atrial fibrillation ([address] 02    Hypertension     Hypertensive Heart Disease    Long term (current) use of anticoagulants     Obesity     JING (obstructive sleep apnea) 07    Rx C-PAP           MEDICATIONS:     Current Outpatient Medications   Medication Sig Dispense Refill    losartan (COZAAR) 100 mg tablet Take 1 Tablet by mouth daily. 90 Tablet 3    warfarin (COUMADIN) 5 mg tablet TAKE 1 TABLET BY MOUTH DAILY EXCEPT ON TUE, TAKE 2 TABS. OR AS DIRECTED BY COUMADIN CLINIC 105 Tab 1    triamterene-hydroCHLOROthiazide (MAXZIDE) 75-50 mg per tablet TAKE 1 TABLET BY MOUTH EVERY DAY 90 Tab 2    labetaloL (NORMODYNE) 200 mg tablet TAKE 2 TABLETS BY MOUTH 2 TIMES DAILY 360 Tab 1    dilTIAZem CD (CARDIZEM CD) 120 mg ER capsule TAKE 1 CAPSULE BY MOUTH EVERY DAY 90 Cap 4    multivitamin (ONE A DAY) tablet Take 1 Tab by mouth daily.  cpap machine kit by Does Not Apply route. 1 Kit 0       Allergies   Allergen Reactions    Enalapril Angioedema    Shellfish Containing Products Swelling             SOCIAL HISTORY:     Social History     Tobacco Use    Smoking status: Never Smoker    Smokeless tobacco: Never Used   Substance Use Topics    Alcohol use: Yes     Alcohol/week: 6.0 standard drinks     Types: 6 Cans of beer per week     Comment: up to 6 beers/week    Drug use: No         FAMILY HISTORY:     Family History   Problem Relation Age of Onset    Coronary Artery Disease Mother     Heart Attack Mother              REVIEW OF SYMPTOMS:     Review of Symptoms:  Constitutional: Negative for fever, chills  HEENT: Negative for nosebleeds, tinnitus, and vision changes. Respiratory: Negative for cough, wheezing  Cardiovascular: Negative for orthopnea, claudication, syncope, and PND. Gastrointestinal: Negative for abdominal pain, diarrhea, melena. Genitourinary: Negative for dysuria  Musculoskeletal: Negative for myalgias. Skin: Negative for rash  Heme: No problems bleeding. Neurological: Negative for speech change and focal weakness. PHYSICAL EXAM:     Physical Exam:  Visit Vitals  /86 (BP 1 Location: Right upper arm, BP Patient Position: Sitting, BP Cuff Size: Large adult)   Pulse 75   Ht 6' 2\" (1.88 m)   Wt 334 lb (151.5 kg)   SpO2 97%   BMI 42.88 kg/m²     Patient appears generally well, mood and affect are appropriate and pleasant. HEENT:  Hearing intact, non-icteric, normocephalic, atraumatic. Neck Exam: Supple,   Lung Exam: Clear to auscultation, even breath sounds. Cardiac Exam: Irregularly irregular with no murmur or rub  Abdomen: Soft, non-tender, normal bowel sounds. Obese   Extremities: Moves all ext well. No lower extremity edema. MSKTL: Overall good ROM ext  Skin: No significant rashes  Psych: Appropriate affect  Neuro - Grossly intact        LABS / OTHER STUDIES reviewed:       Lab Results   Component Value Date/Time    Sodium 142 07/18/2020 07:46 AM    Potassium 3.4 (L) 07/18/2020 07:46 AM    Chloride 102 07/18/2020 07:46 AM    CO2 23 07/18/2020 07:46 AM    Anion gap 6 11/03/2009 02:59 PM    Glucose 111 (H) 07/18/2020 07:46 AM    BUN 14 07/18/2020 07:46 AM    Creatinine 1.01 07/18/2020 07:46 AM    BUN/Creatinine ratio 14 07/18/2020 07:46 AM    GFR est AA 97 07/18/2020 07:46 AM    GFR est non-AA 84 07/18/2020 07:46 AM    Calcium 9.4 07/18/2020 07:46 AM    Bilirubin, total 1.3 (H) 07/18/2020 07:46 AM    Alk.  phosphatase 56 07/18/2020 07:46 AM    Protein, total 7.7 07/18/2020 07:46 AM    Albumin 4.5 07/18/2020 07:46 AM    Globulin 3.6 11/03/2009 02:59 PM    A-G Ratio 1.4 07/18/2020 07:46 AM    ALT (SGPT) 23 07/18/2020 07:46 AM    AST (SGOT) 23 07/18/2020 07:46 AM       Lab Results   Component Value Date/Time    WBC 6.0 07/18/2020 07:46 AM    HGB 13.7 07/18/2020 07:46 AM    HCT 41.3 07/18/2020 07:46 AM    PLATELET 781 94/28/0225 07:46 AM    MCV 92 07/18/2020 07:46 AM       Lab Results   Component Value Date/Time    Cholesterol, total 152 07/18/2020 07:46 AM    HDL Cholesterol 47 07/18/2020 07:46 AM    LDL, calculated 90 07/18/2020 07:46 AM    VLDL, calculated 15 07/18/2020 07:46 AM    Triglyceride 74 07/18/2020 07:46 AM             CARDIAC DIAGNOSTICS:     Cardiac Evaluation Includes:  I reviewed the results below. Echo 4/14 - TDS, Definity, LVEF 50%, LAE  Treadmill Stress Test 10/22/15 - walked 6 min (7 METS), no ischemic EKG changes or chest pain, he was in Afib reaching 103% MPHR (he had his labetalol this AM)  Echo 6/27/16 - TDS. Definity. LVEF 50%. RV normal. Mod LAE. Mild MR  Treadmill Stress Test 7/26/17 - walked 4 min (7.0 METS), normal study   Treadmill stress test 6/6/19 - EKG with Afib, HR, 105, PRWP at start of study --  walked 6 min (7.5 METS), normal stress test     Echo 6/26/19 - TDS.  DefinityJackye Gearing LV dilation. LVEF 55%.  Mod LAE.     ECHO 5/20/2021   · LV: Estimated LVEF is 45 - 50%. Visually measured ejection fraction. Normal wall thickness. Mildly dilated left ventricle. Left ventricular diastolic dysfunction. · RV: Mildly dilated right ventricle. · LA: Severely dilated left atrium. · RA: Severely dilated right atrium. · MV: Mitral valve thickening. Moderate mitral valve regurgitation is present. · TV: Mild tricuspid valve regurgitation is present. Right Ventricular Arterial Pressure (RVSP) is 39 mmHg. · IVC: Moderately elevated central venous pressure (8 mmHg); IVC diameter is larger than 21 mm and collapses more than 50% with respiration.       EKG 4/21/15 - Afib, PVC's, possible anteroseptal infarct age undetermined (PRWP)  EKG 4/28/16 - Afib, HR 80, possible anteroseptal infarct age undetermined, non-specific T wave abn  EKG 2/7/17 - Atrial fibrillation, PVC, Cannot rule out Lateral infarct  Old, PRWP, RAD  EKG 5/3/18 - Afib, PRWP, PVC  EKG 7/9/20 - Afib, old anterior infarct       Treadmill Stress Test 6/28/21 - walked 6:25 (8.2 METS), baseline Afib - no ischemic EKG changes. A couple of couplets and triplets in exercise. 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   Patient expressed understanding of the plan - questions were answered.        He is [employer] fan.  Lives with his girl friend.    Dmitri Mora MD, [address], [address]  [address] [place] [address].  [address], [address] [place] [address]  Ph: [phone]   Ph [phone]

## 2021-07-13 RX ORDER — DILTIAZEM HYDROCHLORIDE 120 MG/1
CAPSULE, COATED, EXTENDED RELEASE ORAL
Qty: 90 CAPSULE | Refills: 4 | Status: SHIPPED | OUTPATIENT
Start: 2021-07-13 | End: 2022-09-01

## 2021-07-16 LAB
INR PPP: 3.1 (ref 0.9–1.2)
PROTHROMBIN TIME: 31.3 SEC (ref 9.1–12)

## 2021-07-22 ENCOUNTER — TELEPHONE (OUTPATIENT)
Dept: CARDIOLOGY CLINIC | Age: 56
End: 2021-07-22

## 2021-07-22 NOTE — TELEPHONE ENCOUNTER
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     Phone [phone]

## 2021-07-22 NOTE — TELEPHONE ENCOUNTER
Returned pt call, ID [id number]. 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  I informed him I have his copies made and he can pick them up at the [facility] office. The pt expressed understanding and agreement. Pt has no further questions or concerns at this time.

## 2021-07-24 DIAGNOSIS — I10 ESSENTIAL HYPERTENSION, BENIGN: ICD-10-CM

## 2021-07-27 RX ORDER — LABETALOL 200 MG/1
TABLET, FILM COATED ORAL
Qty: 360 TABLET | Refills: 3 | Status: SHIPPED | OUTPATIENT
Start: 2021-07-27 | End: 2022-08-01

## 2021-07-27 NOTE — TELEPHONE ENCOUNTER
Refill per VO of Dr. Mara Moseley  Last appt: Visit date not found  Future Appointments   Date Time Provider Lui Nichol   8/31/2021 11:20 AM Callum Balderas MD [facility] BS AMB   7/7/2022  3:00 PM NIEVES YOUNG BS AMB   7/7/2022  4:00 PM MD VEENA Horton BS AMB       Requested Prescriptions     Pending Prescriptions Disp Refills    labetaloL (NORMODYNE) 200 mg tablet [Pharmacy Med Name: LABETALOL  MG TABLET] 360 Tablet 1     Sig: TAKE 2 TABLETS BY MOUTH 2 TIMES DAILY

## 2021-09-12 LAB
INR PPP: 2.9 (ref 0.9–1.2)
PROTHROMBIN TIME: 29.3 SEC (ref 9.1–12)

## 2021-09-26 DIAGNOSIS — I48.91 ATRIAL FIBRILLATION, UNSPECIFIED TYPE (HCC): ICD-10-CM

## 2021-09-27 RX ORDER — WARFARIN SODIUM 5 MG/1
TABLET ORAL
Qty: 105 TABLET | Refills: 1 | Status: SHIPPED | OUTPATIENT
Start: 2021-09-27 | End: 2022-04-01

## 2021-09-27 NOTE — TELEPHONE ENCOUNTER
Refill per VO of Dr. Cardenas Layer  Last appt: 6/28/2021  Future Appointments   Date Time Provider Lui Gandara   12/2/2021  9:00 AM Rojas Mcmahon MD [facility] BS AMB   7/7/2022  3:00 PM NIEVES YOUNG BS AMB   7/7/2022  4:00 PM Jamal Hoffman MD [facility] BS AMB       Requested Prescriptions     Pending Prescriptions Disp Refills    warfarin (COUMADIN) 5 mg tablet [Pharmacy Med Name: WARFARIN SODIUM 5 MG TABLET] 105 Tablet 1     Sig: TAKE 1 TABLET BY MOUTH DAILY EXCEPT ON TUE, TAKE 2 TABS.  OR AS DIRECTED BY COUMADIN CLINIC

## 2021-11-10 DIAGNOSIS — I10 ESSENTIAL HYPERTENSION, BENIGN: ICD-10-CM

## 2021-11-10 RX ORDER — TRIAMTERENE AND HYDROCHLOROTHIAZIDE 75; 50 MG/1; MG/1
TABLET ORAL
Qty: 90 TABLET | Refills: 2 | Status: SHIPPED | OUTPATIENT
Start: 2021-11-10 | End: 2022-08-10 | Stop reason: SDUPTHER

## 2021-11-22 ENCOUNTER — TELEPHONE (OUTPATIENT)
Dept: SLEEP MEDICINE | Age: 56
End: 2021-11-22

## 2021-11-25 LAB
INR PPP: 2.4 (ref 0.9–1.2)
PROTHROMBIN TIME: 23.9 SEC (ref 9.1–12)

## 2022-01-18 ENCOUNTER — TELEPHONE (OUTPATIENT)
Dept: CARDIOLOGY CLINIC | Age: 57
End: 2022-01-18

## 2022-01-18 DIAGNOSIS — Z79.01 ANTICOAGULATED ON COUMADIN: Primary | ICD-10-CM

## 2022-01-18 NOTE — TELEPHONE ENCOUNTER
[facility]  [phone]    Sent orders.

## 2022-01-18 NOTE — TELEPHONE ENCOUNTER
Patient is requesting a standing order for Lab Vimal is trying to go get labs done today. Patient is trying to get his INR check.     [phone]

## 2022-03-11 ENCOUNTER — TELEPHONE (OUTPATIENT)
Dept: CARDIOLOGY CLINIC | Age: 57
End: 2022-03-11

## 2022-03-11 DIAGNOSIS — Z79.01 ANTICOAGULATED ON COUMADIN: Primary | ICD-10-CM

## 2022-03-11 NOTE — TELEPHONE ENCOUNTER
Patient would like his standing Lab order for his Prothrombin Time sent over to the [employer].    [phone]

## 2022-03-13 LAB
INR PPP: 2.2 (ref 0.9–1.2)
PROTHROMBIN TIME: 22.3 SEC (ref 9.1–12)

## 2022-04-01 DIAGNOSIS — I48.91 ATRIAL FIBRILLATION, UNSPECIFIED TYPE (HCC): ICD-10-CM

## 2022-04-01 RX ORDER — WARFARIN SODIUM 5 MG/1
TABLET ORAL
Qty: 110 TABLET | Refills: 1 | Status: SHIPPED | OUTPATIENT
Start: 2022-04-01 | End: 2022-09-30

## 2022-04-01 NOTE — TELEPHONE ENCOUNTER
Refill per VO of Dr. Regi Younger  Last appt: 6/28/2021  Future Appointments   Date Time Provider Liu Nichol   7/7/2022  3:00 PM NIEVES YOUNG   7/7/2022  4:00 PM MD VEENA Olivier BS AMB       Requested Prescriptions     Signed Prescriptions Disp Refills    warfarin (COUMADIN) 5 mg tablet 110 Tablet 1     Sig: TAKE 1 TABLET BY MOUTH DAILY EXCEPT ON TUE, TAKE 2 TABS.  OR AS DIRECTED BY COUMADIN CLINIC     Authorizing Provider: Staci Caba     Ordering User: Clayton Goodwin

## 2022-08-01 DIAGNOSIS — I10 ESSENTIAL HYPERTENSION, BENIGN: ICD-10-CM

## 2022-08-01 RX ORDER — LOSARTAN POTASSIUM 100 MG/1
TABLET ORAL
Qty: 90 TABLET | Refills: 0 | Status: SHIPPED | OUTPATIENT
Start: 2022-08-01 | End: 2022-10-31

## 2022-08-01 RX ORDER — LABETALOL 200 MG/1
TABLET, FILM COATED ORAL
Qty: 360 TABLET | Refills: 0 | Status: SHIPPED | OUTPATIENT
Start: 2022-08-01 | End: 2022-10-31

## 2022-08-01 NOTE — TELEPHONE ENCOUNTER
Refill per VO of Dr. Theo Hammer  Last appt: 6/28/2021  Future Appointments   Date Time Provider Lui Gandara   9/1/2022  8:00 AM NIEVES YOUNG   9/1/2022  8:40 AM MD VEENA Ordonez BS AMB       Requested Prescriptions     Pending Prescriptions Disp Refills    losartan (COZAAR) 100 mg tablet [Pharmacy Med Name: LOSARTAN POTASSIUM 100 MG TAB] 90 Tablet 3     Sig: TAKE 1 TABLET BY MOUTH EVERY DAY    labetaloL (NORMODYNE) 200 mg tablet [Pharmacy Med Name: LABETALOL  MG TABLET] 360 Tablet 3     Sig: TAKE 2 TABLETS BY MOUTH 2 TIMES DAILY

## 2022-08-06 LAB — INR, EXTERNAL: 2 (ref 2–3)

## 2022-08-10 DIAGNOSIS — I10 ESSENTIAL HYPERTENSION, BENIGN: ICD-10-CM

## 2022-08-10 RX ORDER — TRIAMTERENE AND HYDROCHLOROTHIAZIDE 75; 50 MG/1; MG/1
1 TABLET ORAL DAILY
Qty: 90 TABLET | Refills: 0 | Status: SHIPPED | OUTPATIENT
Start: 2022-08-10 | End: 2022-11-04

## 2022-08-10 NOTE — TELEPHONE ENCOUNTER
Refill per VO of Dr. Domitila Aguillon  Last appt: 6/28/21  Future Appointments   Date Time Provider Lui Hilliardi   9/1/2022  8:00 AM NIEVES YOUNG   9/1/2022  8:40 AM MD VEENA Ordonez BS AMB       Requested Prescriptions     Signed Prescriptions Disp Refills    triamterene-hydroCHLOROthiazide (MAXZIDE) 75-50 mg per tablet 90 Tablet 0     Sig: Take 1 Tablet by mouth in the morning.      Authorizing Provider: Susana Paz     Ordering User: Rodrigo Boyle

## 2022-08-15 ENCOUNTER — TELEPHONE ANTICOAG (OUTPATIENT)
Dept: CARDIOLOGY CLINIC | Age: 57
End: 2022-08-15

## 2022-09-01 ENCOUNTER — ANCILLARY PROCEDURE (OUTPATIENT)
Dept: CARDIOLOGY CLINIC | Age: 57
End: 2022-09-01
Payer: COMMERCIAL

## 2022-09-01 ENCOUNTER — OFFICE VISIT (OUTPATIENT)
Dept: CARDIOLOGY CLINIC | Age: 57
End: 2022-09-01

## 2022-09-01 VITALS
HEIGHT: 74 IN | SYSTOLIC BLOOD PRESSURE: 142 MMHG | DIASTOLIC BLOOD PRESSURE: 90 MMHG | WEIGHT: 315 LBS | BODY MASS INDEX: 40.43 KG/M2

## 2022-09-01 VITALS
HEART RATE: 103 BPM | SYSTOLIC BLOOD PRESSURE: 150 MMHG | DIASTOLIC BLOOD PRESSURE: 94 MMHG | BODY MASS INDEX: 40.43 KG/M2 | OXYGEN SATURATION: 99 % | HEIGHT: 74 IN | WEIGHT: 315 LBS

## 2022-09-01 DIAGNOSIS — I10 ESSENTIAL HYPERTENSION: ICD-10-CM

## 2022-09-01 DIAGNOSIS — R53.83 FATIGUE, UNSPECIFIED TYPE: ICD-10-CM

## 2022-09-01 DIAGNOSIS — R06.02 SOB (SHORTNESS OF BREATH): ICD-10-CM

## 2022-09-01 DIAGNOSIS — I47.29 NSVT (NONSUSTAINED VENTRICULAR TACHYCARDIA): ICD-10-CM

## 2022-09-01 DIAGNOSIS — I48.21 PERMANENT ATRIAL FIBRILLATION (HCC): Primary | ICD-10-CM

## 2022-09-01 LAB
ECHO AO ASC DIAM: 2.8 CM
ECHO AO ASCENDING AORTA INDEX: 1.01 CM/M2
ECHO AO ROOT DIAM: 3.2 CM
ECHO AO ROOT INDEX: 1.16 CM/M2
ECHO AV MEAN GRADIENT: 4 MMHG
ECHO AV MEAN VELOCITY: 0.9 M/S
ECHO AV PEAK GRADIENT: 6 MMHG
ECHO AV PEAK VELOCITY: 1.3 M/S
ECHO AV VTI: 25.9 CM
ECHO LA DIAMETER INDEX: 2.39 CM/M2
ECHO LA DIAMETER: 6.6 CM
ECHO LA TO AORTIC ROOT RATIO: 2.06
ECHO LV EDV A2C: 113 ML
ECHO LV EDV A4C: 122 ML
ECHO LV EDV BP: 119 ML (ref 67–155)
ECHO LV EDV INDEX A4C: 44 ML/M2
ECHO LV EDV INDEX BP: 43 ML/M2
ECHO LV EDV NDEX A2C: 41 ML/M2
ECHO LV EJECTION FRACTION A2C: 64 %
ECHO LV EJECTION FRACTION A4C: 45 %
ECHO LV EJECTION FRACTION BIPLANE: 55 % (ref 55–100)
ECHO LV ESV A2C: 41 ML
ECHO LV ESV A4C: 67 ML
ECHO LV ESV BP: 53 ML (ref 22–58)
ECHO LV ESV INDEX A2C: 15 ML/M2
ECHO LV ESV INDEX A4C: 24 ML/M2
ECHO LV ESV INDEX BP: 19 ML/M2
ECHO LV FRACTIONAL SHORTENING: 34 % (ref 28–44)
ECHO LV INTERNAL DIMENSION DIASTOLE INDEX: 2.14 CM/M2
ECHO LV INTERNAL DIMENSION DIASTOLIC: 5.9 CM (ref 4.2–5.9)
ECHO LV INTERNAL DIMENSION SYSTOLIC INDEX: 1.41 CM/M2
ECHO LV INTERNAL DIMENSION SYSTOLIC: 3.9 CM
ECHO LV IVSD: 1.2 CM (ref 0.6–1)
ECHO LV MASS 2D: 305.5 G (ref 88–224)
ECHO LV MASS INDEX 2D: 110.7 G/M2 (ref 49–115)
ECHO LV POSTERIOR WALL DIASTOLIC: 1.2 CM (ref 0.6–1)
ECHO LV RELATIVE WALL THICKNESS RATIO: 0.41
ECHO RV TAPSE: 2.1 CM (ref 1.7–?)

## 2022-09-01 PROCEDURE — 93306 TTE W/DOPPLER COMPLETE: CPT | Performed by: SPECIALIST

## 2022-09-01 PROCEDURE — 93000 ELECTROCARDIOGRAM COMPLETE: CPT | Performed by: SPECIALIST

## 2022-09-01 PROCEDURE — 99214 OFFICE O/P EST MOD 30 MIN: CPT | Performed by: SPECIALIST

## 2022-09-01 RX ORDER — DILTIAZEM HYDROCHLORIDE 180 MG/1
180 CAPSULE, COATED, EXTENDED RELEASE ORAL DAILY
Qty: 90 CAPSULE | Refills: 3 | Status: SHIPPED | OUTPATIENT
Start: 2022-09-01

## 2022-09-01 NOTE — PROGRESS NOTES
Chief Complaint   Patient presents with    Follow-up     1 year follow up     Hypertension    Irregular Heart Beat     afib     Vitals:    09/01/22 0833 09/01/22 0846 09/01/22 0849   BP: (!) 142/90 (!) 170/100 (!) 150/94   BP 1 Location:  Left upper arm Left upper arm   BP Patient Position:  Sitting Sitting   BP Cuff Size:  Large adult Large adult   Pulse: (!) 103     Height: 6' 2\" (1.88 m)     Weight: (!) 351 lb (159.2 kg)     SpO2: 99%       Chest pain denied   SOB sometimes  Palpitations denied   Swelling in hands/feet denied   Dizziness denied   Recent hospital stays denied   Refills denied

## 2022-09-01 NOTE — PROGRESS NOTES
Chris Batista MD. [facility] [place]              Patient: Kin Stephens  : 1965      Today's Date: 2022            HISTORY OF PRESENT ILLNESS:     History of Present Illness:  Doing OK. He had COVID in 2021 - has had SOB since then. Myranda catalan. Has class 2 JOSUE. PAST MEDICAL HISTORY:     Past Medical History:   Diagnosis Date    Atrial fibrillation ([address] 02    Hypertension     Hypertensive Heart Disease    Long term (current) use of anticoagulants     Obesity     JING (obstructive sleep apnea) 07    Rx C-PAP             CURRENT MEDICATIONS:      Current Outpatient Medications   Medication Sig Dispense Refill    triamterene-hydroCHLOROthiazide (MAXZIDE) 75-50 mg per tablet Take 1 Tablet by mouth in the morning. 90 Tablet 0    losartan (COZAAR) 100 mg tablet TAKE 1 TABLET BY MOUTH EVERY DAY 90 Tablet 0    labetaloL (NORMODYNE) 200 mg tablet TAKE 2 TABLETS BY MOUTH 2 TIMES DAILY 360 Tablet 0    warfarin (COUMADIN) 5 mg tablet TAKE 1 TABLET BY MOUTH DAILY EXCEPT ON TUE, TAKE 2 TABS. OR AS DIRECTED BY COUMADIN CLINIC 110 Tablet 1    dilTIAZem ER (CARDIZEM CD) 120 mg capsule TAKE 1 CAPSULE BY MOUTH EVERY DAY 90 Capsule 4    multivitamin (ONE A DAY) tablet Take 1 Tab by mouth daily. cpap machine kit by Does Not Apply route. 1 Kit 0       Allergies   Allergen Reactions    Enalapril Angioedema    Shellfish Containing Products Swelling             SOCIAL HISTORY:     Social History     Tobacco Use    Smoking status: Never    Smokeless tobacco: Never   Substance Use Topics    Alcohol use:  Yes     Alcohol/week: 6.0 standard drinks     Types: 6 Cans of beer per week     Comment: up to 6 beers/week    Drug use: No         FAMILY HISTORY:     Family History   Problem Relation Age of Onset    Coronary Art Dis Mother     Heart Attack Mother           REVIEW OF SYMPTOMS:      Review of Symptoms:  Constitutional: Negative for fever, chills  HEENT: Negative for nosebleeds, tinnitus, and vision changes. Respiratory: Negative for cough, wheezing  Cardiovascular: Negative for orthopnea, claudication, syncope, and PND. Gastrointestinal: Negative for abdominal pain, diarrhea, melena. Genitourinary: Negative for dysuria  Musculoskeletal: Negative for myalgias. Skin: Negative for rash  Heme: No problems bleeding. Neurological: Negative for speech change and focal weakness. PHYSICAL EXAM:      Physical Exam:  Visit Vitals  BP (!) 150/94 (BP 1 Location: Left upper arm, BP Patient Position: Sitting, BP Cuff Size: Large adult)   Pulse (!) 103   Ht 6' 2\" (1.88 m)   Wt (!) 351 lb (159.2 kg)   SpO2 99%   BMI 45.07 kg/m²       Patient appears generally well, mood and affect are appropriate and pleasant. HEENT:  Hearing intact, non-icteric, normocephalic, atraumatic. Neck Exam: Supple,   Lung Exam: Clear to auscultation, even breath sounds. Cardiac Exam: Irregularly irregular with no murmur or rub  Abdomen: Soft, non-tender, normal bowel sounds. Obese   Extremities: Moves all ext well. No lower extremity edema. MSKTL: Overall good ROM ext  Skin: No significant rashes  Psych: Appropriate affect  Neuro - Grossly intact           LABS / OTHER STUDIES reviewed:         Lab Results   Component Value Date/Time    Sodium 142 07/18/2020 07:46 AM    Potassium 3.4 (L) 07/18/2020 07:46 AM    Chloride 102 07/18/2020 07:46 AM    CO2 23 07/18/2020 07:46 AM    Anion gap 6 11/03/2009 02:59 PM    Glucose 111 (H) 07/18/2020 07:46 AM    BUN 14 07/18/2020 07:46 AM    Creatinine 1.01 07/18/2020 07:46 AM    BUN/Creatinine ratio 14 07/18/2020 07:46 AM    GFR est AA 97 07/18/2020 07:46 AM    GFR est non-AA 84 07/18/2020 07:46 AM    Calcium 9.4 07/18/2020 07:46 AM    Bilirubin, total 1.3 (H) 07/18/2020 07:46 AM    Alk.  phosphatase 56 07/18/2020 07:46 AM    Protein, total 7.7 07/18/2020 07:46 AM    Albumin 4.5 07/18/2020 07:46 AM    Globulin 3.6 11/03/2009 02:59 PM    A-G Ratio 1.4 07/18/2020 07:46 AM    ALT (SGPT) 23 07/18/2020 07:46 AM    AST (SGOT) 23 07/18/2020 07:46 AM       Lab Results   Component Value Date/Time    WBC 6.0 07/18/2020 07:46 AM    HGB 13.7 07/18/2020 07:46 AM    HCT 41.3 07/18/2020 07:46 AM    PLATELET 013 37/56/0792 07:46 AM    MCV 92 07/18/2020 07:46 AM       Lab Results   Component Value Date/Time    Cholesterol, total 152 07/18/2020 07:46 AM    HDL Cholesterol 47 07/18/2020 07:46 AM    LDL, calculated 90 07/18/2020 07:46 AM    VLDL, calculated 15 07/18/2020 07:46 AM    Triglyceride 74 07/18/2020 07:46 AM       Lab Results   Component Value Date/Time    TSH 0.982 08/22/2015 11:25 AM             CARDIAC DIAGNOSTICS:      Cardiac Evaluation Includes:  I reviewed the results below. Echo 4/14 - TDS, Definity, LVEF 50%, LAE  Treadmill Stress Test 10/22/15 - walked 6 min (7 METS), no ischemic EKG changes or chest pain, he was in Afib reaching 103% MPHR (he had his labetalol this AM)  Echo 6/27/16 - TDS. Definity. LVEF 50%. RV normal. Mod LAE. Mild MR  Treadmill Stress Test 7/26/17 - walked 4 min (7.0 METS), normal study   Treadmill stress test 6/6/19 - EKG with Afib, HR, 105, PRWP at start of study --  walked 6 min (7.5 METS), normal stress test      Echo 6/26/19 - TDS. Definity. Mild LV dilation. LVEF 55%. Mod LAE.      ECHO 5/20/2021   · LV: Estimated LVEF is 45 - 50%. Visually measured ejection fraction. Normal wall thickness. Mildly dilated left ventricle. Left ventricular diastolic dysfunction. · RV: Mildly dilated right ventricle. · LA: Severely dilated left atrium. · RA: Severely dilated right atrium. · MV: Mitral valve thickening. Moderate mitral valve regurgitation is present. · TV: Mild tricuspid valve regurgitation is present. Right Ventricular Arterial Pressure (RVSP) is 39 mmHg. · IVC: Moderately elevated central venous pressure (8 mmHg); IVC diameter is larger than 21 mm and collapses more than 50% with respiration.         EKG 4/21/15 - Afib, PVC's, possible anteroseptal infarct age undetermined (PRWP)  EKG 4/28/16 - Afib, HR 80, possible anteroseptal infarct age undetermined, non-specific T wave abn  EKG 2/7/17 - Atrial fibrillation, PVC, Cannot rule out Lateral infarct  Old, PRWP, RAD  EKG 5/3/18 - Afib, PRWP, PVC  EKG 7/9/20 - Afib, old anterior infarct   EKG 9/1/22 - Afib, old anterior infarct, low voltage       Treadmill Stress Test 6/28/21 - walked 6:25 (8.2 METS), baseline Afib - no ischemic EKG changes. A couple of couplets and triplets in exercise. Echo 9/1/22 - PRELIM - LVEF 50%       ASSESSMENT AND PLAN:      Assessment and Plan:     1) Chronic Afib  - continue rate control and anticoagulation (he prefers coumadin over DOAC's) -- I advised DOAC but he wants to stick with warfarin   - HR OK  - cont labetalol and cardizem      2) HTN  - Goal BP at least < 135/85 at home ideally  - On 9/1/22 - BP mildly high at home - 130's/80's at home --> check labs --- increase Diltiazem to      3) Morbid Obesity   - in 2019 he has lost 50 lbs then gained 20 lbs back   - continue to work weight loss and consider weight loss surgery   - he has gained weight back past year   - has class 2 JOSUE   ---> Info given on weight loss clinic      4) 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 He is [employer] fan. Lives with his girl friend. Bro Centeno MD, [address]  [address], [address]      [facility] [address]  [address]  [place] [address] [place], [address] [place] [address]  Ph: [phone]                               Ph [phone]

## 2022-09-07 LAB
ALBUMIN SERPL-MCNC: 4.5 G/DL (ref 3.8–4.9)
ALBUMIN/GLOB SERPL: 1.5 {RATIO} (ref 1.2–2.2)
ALP SERPL-CCNC: 63 IU/L (ref 44–121)
ALT SERPL-CCNC: 16 IU/L (ref 0–44)
AST SERPL-CCNC: 16 IU/L (ref 0–40)
BILIRUB SERPL-MCNC: 0.8 MG/DL (ref 0–1.2)
BUN SERPL-MCNC: 20 MG/DL (ref 6–24)
BUN/CREAT SERPL: 19 (ref 9–20)
CALCIUM SERPL-MCNC: 9.6 MG/DL (ref 8.7–10.2)
CHLORIDE SERPL-SCNC: 102 MMOL/L (ref 96–106)
CHOLEST SERPL-MCNC: 171 MG/DL (ref 100–199)
CO2 SERPL-SCNC: 23 MMOL/L (ref 20–29)
CREAT SERPL-MCNC: 1.05 MG/DL (ref 0.76–1.27)
EGFR: 83 ML/MIN/1.73
ERYTHROCYTE [DISTWIDTH] IN BLOOD BY AUTOMATED COUNT: 12.5 % (ref 11.6–15.4)
GLOBULIN SER CALC-MCNC: 3.1 G/DL (ref 1.5–4.5)
GLUCOSE SERPL-MCNC: 128 MG/DL (ref 65–99)
HCT VFR BLD AUTO: 38.9 % (ref 37.5–51)
HDL SERPL-SCNC: 23.2 UMOL/L
HDLC SERPL-MCNC: 41 MG/DL
HGB BLD-MCNC: 12.9 G/DL (ref 13–17.7)
IMP & REVIEW OF LAB RESULTS: NORMAL
LDL SERPL QN: 21.1 NM
LDL SERPL-SCNC: 1301 NMOL/L
LDL SMALL SERPL-SCNC: 474 NMOL/L
LDLC SERPL CALC-MCNC: 116 MG/DL (ref 0–99)
LP-IR SCORE SERPL: 56
MCH RBC QN AUTO: 30.4 PG (ref 26.6–33)
MCHC RBC AUTO-ENTMCNC: 33.2 G/DL (ref 31.5–35.7)
MCV RBC AUTO: 92 FL (ref 79–97)
NT-PROBNP SERPL-MCNC: 229 PG/ML (ref 0–210)
PLATELET # BLD AUTO: 156 X10E3/UL (ref 150–450)
POTASSIUM SERPL-SCNC: 3.8 MMOL/L (ref 3.5–5.2)
PROT SERPL-MCNC: 7.6 G/DL (ref 6–8.5)
RBC # BLD AUTO: 4.25 X10E6/UL (ref 4.14–5.8)
SODIUM SERPL-SCNC: 140 MMOL/L (ref 134–144)
TRIGL SERPL-MCNC: 75 MG/DL (ref 0–149)
TSH SERPL DL<=0.005 MIU/L-ACNC: 1.32 UIU/ML (ref 0.45–4.5)
WBC # BLD AUTO: 4.8 X10E3/UL (ref 3.4–10.8)

## 2022-09-30 DIAGNOSIS — I48.91 ATRIAL FIBRILLATION, UNSPECIFIED TYPE (HCC): ICD-10-CM

## 2022-09-30 RX ORDER — WARFARIN SODIUM 5 MG/1
TABLET ORAL
Qty: 110 TABLET | Refills: 1 | Status: SHIPPED | OUTPATIENT
Start: 2022-09-30

## 2022-09-30 NOTE — TELEPHONE ENCOUNTER
Refill per VO of Dr. Munoz Button  Last appt: 9/1/2022  Future Appointments   Date Time Provider Lui Gandara   12/8/2022  4:20 PM Jaimie Cronin MD [facility]       Requested Prescriptions     Signed Prescriptions Disp Refills    warfarin (COUMADIN) 5 mg tablet 110 Tablet 1     Sig: TAKE 1 TABLET BY MOUTH DAILY EXCEPT ON TUE, TAKE 2 TABS.  OR AS DIRECTED BY COUMADIN CLINIC     Authorizing Provider: Lily Hogan     Ordering User: Rafael Huizar

## 2022-10-15 LAB — INR, EXTERNAL: 2.6 (ref 2–3)

## 2022-10-18 ENCOUNTER — TELEPHONE (OUTPATIENT)
Dept: CARDIOLOGY CLINIC | Age: 57
End: 2022-10-18

## 2022-10-18 NOTE — TELEPHONE ENCOUNTER
Pt has a upcoming DOT physical and will need documents( results echo, ekg, INR ruslts ) from the office that he has gotten previously for the physical, pt will need this as soon as possible, please advise         Pt #  [phone]

## 2022-10-18 NOTE — TELEPHONE ENCOUNTER
Prothombin result     PT    25.8     INR   2.6     Date collected 10/15/2022.      Received fax result form [facility].

## 2022-10-18 NOTE — TELEPHONE ENCOUNTER
Latest documented testing:  Last INR 10/15/22    Last Echo 9/1/22  Last EKG 9/1/22    Last stress test 6/28/21    Need fax number or how to ? Called pt,  LVM asking for how he wants to receive these MR, or if we should be watching for a fax?

## 2022-10-19 NOTE — TELEPHONE ENCOUNTER
Spoke to pt,  He requested the records be left at the [address] office for him to  tomorrow after lunch. I let him know that we do not have patients and to knock on the door when he gets here.

## 2022-10-20 ENCOUNTER — TELEPHONE ANTICOAG (OUTPATIENT)
Dept: CARDIOLOGY CLINIC | Age: 57
End: 2022-10-20

## 2022-10-31 DIAGNOSIS — I10 ESSENTIAL HYPERTENSION, BENIGN: ICD-10-CM

## 2022-10-31 RX ORDER — LABETALOL 200 MG/1
TABLET, FILM COATED ORAL
Qty: 360 TABLET | Refills: 3 | Status: SHIPPED | OUTPATIENT
Start: 2022-10-31

## 2022-10-31 RX ORDER — LOSARTAN POTASSIUM 100 MG/1
TABLET ORAL
Qty: 90 TABLET | Refills: 3 | Status: SHIPPED | OUTPATIENT
Start: 2022-10-31

## 2022-10-31 NOTE — TELEPHONE ENCOUNTER
Refill per VO of Dr. Trent Flynn  Last appt: 9/1/2022  Future Appointments   Date Time Provider Lui Nichol   12/8/2022  4:20 PM Adina Owen MD [facility]       Requested Prescriptions     Pending Prescriptions Disp Refills    losartan (COZAAR) 100 mg tablet [Pharmacy Med Name: LOSARTAN POTASSIUM 100 MG TAB] 90 Tablet 3     Sig: TAKE 1 TABLET BY MOUTH EVERY DAY    labetaloL (NORMODYNE) 200 mg tablet [Pharmacy Med Name: LABETALOL  MG TABLET] 90 Tablet 3     Sig: TAKE 2 TABLETS BY MOUTH TWICE A DAY

## 2022-11-04 DIAGNOSIS — I10 ESSENTIAL HYPERTENSION, BENIGN: ICD-10-CM

## 2022-11-04 RX ORDER — TRIAMTERENE AND HYDROCHLOROTHIAZIDE 75; 50 MG/1; MG/1
TABLET ORAL
Qty: 90 TABLET | Refills: 1 | Status: SHIPPED | OUTPATIENT
Start: 2022-11-04

## 2022-11-04 NOTE — TELEPHONE ENCOUNTER
Refill per VO of Dr. Zonia Cockayne  Last appt: 9/1/22  Future Appointments   Date Time Provider Lui Gandara   12/8/2022  4:20 PM Malcolm Banks MD [facility]       Requested Prescriptions     Signed Prescriptions Disp Refills    triamterene-hydroCHLOROthiazide (MAXZIDE) 75-50 mg per tablet 90 Tablet 1     Sig: TAKE 1 TABLET BY MOUTH EVERY DAY IN THE MORNING     Authorizing Provider: Josefina Donovan     Ordering User: Rose Marie Vance Statement Selected

## 2022-12-20 ENCOUNTER — OFFICE VISIT (OUTPATIENT)
Dept: CARDIOLOGY CLINIC | Age: 57
End: 2022-12-20
Payer: COMMERCIAL

## 2022-12-20 VITALS
DIASTOLIC BLOOD PRESSURE: 100 MMHG | BODY MASS INDEX: 40.3 KG/M2 | WEIGHT: 314 LBS | SYSTOLIC BLOOD PRESSURE: 166 MMHG | HEIGHT: 74 IN | HEART RATE: 87 BPM | OXYGEN SATURATION: 98 %

## 2022-12-20 DIAGNOSIS — I48.21 PERMANENT ATRIAL FIBRILLATION (HCC): Primary | ICD-10-CM

## 2022-12-20 DIAGNOSIS — I10 ESSENTIAL HYPERTENSION: ICD-10-CM

## 2022-12-20 PROCEDURE — 3078F DIAST BP <80 MM HG: CPT | Performed by: SPECIALIST

## 2022-12-20 PROCEDURE — 3074F SYST BP LT 130 MM HG: CPT | Performed by: SPECIALIST

## 2022-12-20 PROCEDURE — 99214 OFFICE O/P EST MOD 30 MIN: CPT | Performed by: SPECIALIST

## 2022-12-20 RX ORDER — CHLORTHALIDONE 25 MG/1
25 TABLET ORAL DAILY
Qty: 30 TABLET | Refills: 12 | Status: SHIPPED | OUTPATIENT
Start: 2022-12-20

## 2022-12-20 RX ORDER — SPIRONOLACTONE 25 MG/1
25 TABLET ORAL DAILY
Qty: 30 TABLET | Refills: 12 | Status: SHIPPED | OUTPATIENT
Start: 2022-12-20

## 2022-12-20 NOTE — PROGRESS NOTES
Jose De Jesus Gant MD. [facility] [place]              Patient: Philomena Danielle  : 1965      Today's Date: 2022            HISTORY OF PRESENT ILLNESS:     History of Present Illness:  Doing OK. He had COVID in 2021 - has had SOB since then. Myranda catalan. Has class 2 JOSUE. No CP. BP at home was 133/87 yesterday. PAST MEDICAL HISTORY:     Past Medical History:   Diagnosis Date    Atrial fibrillation ([address] 2002    COVID-2021    Hypertension     Hypertensive Heart Disease    Long term (current) use of anticoagulants     Obesity     JING (obstructive sleep apnea) 2007    Rx C-PAP             CURRENT MEDICATIONS:      Current Outpatient Medications   Medication Sig Dispense Refill    triamterene-hydroCHLOROthiazide (MAXZIDE) 75-50 mg per tablet TAKE 1 TABLET BY MOUTH EVERY DAY IN THE MORNING 90 Tablet 1    losartan (COZAAR) 100 mg tablet TAKE 1 TABLET BY MOUTH EVERY DAY 90 Tablet 3    labetaloL (NORMODYNE) 200 mg tablet TAKE 2 TABLETS BY MOUTH TWICE A  Tablet 3    warfarin (COUMADIN) 5 mg tablet TAKE 1 TABLET BY MOUTH DAILY EXCEPT ON TUE, TAKE 2 TABS. OR AS DIRECTED BY COUMADIN CLINIC 110 Tablet 1    dilTIAZem ER (CARDIZEM CD) 180 mg capsule Take 1 Capsule by mouth daily. 90 Capsule 3    multivitamin (ONE A DAY) tablet Take 1 Tab by mouth daily. cpap machine kit by Does Not Apply route. 1 Kit 0       Allergies   Allergen Reactions    Enalapril Angioedema    Shellfish Containing Products Swelling             SOCIAL HISTORY:     Social History     Tobacco Use    Smoking status: Never    Smokeless tobacco: Never   Substance Use Topics    Alcohol use:  Yes     Alcohol/week: 6.0 standard drinks     Types: 6 Cans of beer per week     Comment: up to 6 beers/week    Drug use: No         FAMILY HISTORY:     Family History   Problem Relation Age of Onset    Coronary Art Dis Mother     Heart Attack Mother           REVIEW OF SYMPTOMS:      Review of Symptoms:  Constitutional: Negative for fever, chills  HEENT: Negative for nosebleeds, tinnitus, and vision changes. Respiratory: Negative for cough, wheezing  Cardiovascular: Negative for orthopnea, claudication, syncope, and PND. Gastrointestinal: Negative for abdominal pain, diarrhea, melena. Genitourinary: Negative for dysuria  Musculoskeletal: Negative for myalgias. Skin: Negative for rash  Heme: No problems bleeding. Neurological: Negative for speech change and focal weakness. PHYSICAL EXAM:      Physical Exam:  Visit Vitals  BP (!) 166/100 (BP 1 Location: Left upper arm, BP Patient Position: Sitting)   Pulse 87   Ht 6' 2\" (1.88 m)   Wt 314 lb (142.4 kg)   SpO2 98%   BMI 40.32 kg/m²       Patient appears generally well, mood and affect are appropriate and pleasant. HEENT:  Hearing intact, non-icteric, normocephalic, atraumatic. Neck Exam: Supple,   Lung Exam: Clear to auscultation, even breath sounds. Cardiac Exam: Irregularly irregular with no murmur or rub  Abdomen: Soft, non-tender, normal bowel sounds. Obese   Extremities: Moves all ext well. No lower extremity edema. MSKTL: Overall good ROM ext  Skin: No significant rashes  Psych: Appropriate affect  Neuro - Grossly intact           LABS / OTHER STUDIES reviewed:         Lab Results   Component Value Date/Time    Sodium 140 09/03/2022 10:59 AM    Potassium 3.8 09/03/2022 10:59 AM    Chloride 102 09/03/2022 10:59 AM    CO2 23 09/03/2022 10:59 AM    Anion gap 6 11/03/2009 02:59 PM    Glucose 128 (H) 09/03/2022 10:59 AM    BUN 20 09/03/2022 10:59 AM    Creatinine 1.05 09/03/2022 10:59 AM    BUN/Creatinine ratio 19 09/03/2022 10:59 AM    GFR est AA 97 07/18/2020 07:46 AM    GFR est non-AA 84 07/18/2020 07:46 AM    Calcium 9.6 09/03/2022 10:59 AM    Bilirubin, total 0.8 09/03/2022 10:59 AM    Alk.  phosphatase 63 09/03/2022 10:59 AM    Protein, total 7.6 09/03/2022 10:59 AM    Albumin 4.5 09/03/2022 10:59 AM    Globulin 3.6 11/03/2009 02:59 PM    A-G Ratio 1.5 09/03/2022 10:59 AM    ALT (SGPT) 16 09/03/2022 10:59 AM    AST (SGOT) 16 09/03/2022 10:59 AM       Lab Results   Component Value Date/Time    WBC 4.8 09/03/2022 10:59 AM    HGB 12.9 (L) 09/03/2022 10:59 AM    HCT 38.9 09/03/2022 10:59 AM    PLATELET 876 94/28/7704 10:59 AM    MCV 92 09/03/2022 10:59 AM       Lab Results   Component Value Date/Time    Cholesterol, total 152 07/18/2020 07:46 AM    Cholesterol, Total 171 09/03/2022 10:59 AM    HDL Cholesterol 47 07/18/2020 07:46 AM    LDL, calculated 90 07/18/2020 07:46 AM    VLDL, calculated 15 07/18/2020 07:46 AM    Triglyceride 74 07/18/2020 07:46 AM       Lab Results   Component Value Date/Time    TSH 1.320 09/03/2022 10:59 AM             CARDIAC DIAGNOSTICS:      Cardiac Evaluation Includes:  I reviewed the results below. Echo 4/14 - TDS, Definity, LVEF 50%, LAE  Treadmill Stress Test 10/22/15 - walked 6 min (7 METS), no ischemic EKG changes or chest pain, he was in Afib reaching 103% MPHR (he had his labetalol this AM)  Echo 6/27/16 - TDS. Definity. LVEF 50%. RV normal. Mod LAE. Mild MR  Treadmill Stress Test 7/26/17 - walked 4 min (7.0 METS), normal study   Treadmill stress test 6/6/19 - EKG with Afib, HR, 105, PRWP at start of study --  walked 6 min (7.5 METS), normal stress test      Echo 6/26/19 - TDS. Definity. Mild LV dilation. LVEF 55%. Mod LAE.      ECHO 5/20/2021   · LV: Estimated LVEF is 45 - 50%. Visually measured ejection fraction. Normal wall thickness. Mildly dilated left ventricle. Left ventricular diastolic dysfunction. · RV: Mildly dilated right ventricle. · LA: Severely dilated left atrium. · RA: Severely dilated right atrium. · MV: Mitral valve thickening. Moderate mitral valve regurgitation is present. · TV: Mild tricuspid valve regurgitation is present. Right Ventricular Arterial Pressure (RVSP) is 39 mmHg.   · IVC: Moderately elevated central venous pressure (8 mmHg); IVC diameter is larger than 21 mm and collapses more than 50% with respiration. EKG 4/21/15 - Afib, PVC's, possible anteroseptal infarct age undetermined (PRWP)  EKG 4/28/16 - Afib, HR 80, possible anteroseptal infarct age undetermined, non-specific T wave abn  EKG 2/7/17 - Atrial fibrillation, PVC, Cannot rule out Lateral infarct  Old, PRWP, RAD  EKG 5/3/18 - Afib, PRWP, PVC  EKG 7/9/20 - Afib, old anterior infarct   EKG 9/1/22 - Afib, old anterior infarct, low voltage       Treadmill Stress Test 6/28/21 - walked 6:25 (8.2 METS), baseline Afib - no ischemic EKG changes. A couple of couplets and triplets in exercise. Echo 9/1/22 - TDS. LVEF 50-55%, severe LAE,        ASSESSMENT AND PLAN:      Assessment and Plan:     1) Chronic Afib  - continue rate control and anticoagulation  - Will switch over from coumadin to Eliquis 5 mg BID  - HR OK  - cont labetalol and cardizem      2) HTN  - Goal BP at least < 130/80 at home ideally  - On 12/20/22 - BP mildly high at home, but higher in the office ----> Will stop triamterene-HCTZ and instead use switch him to chlorthalidone 25 mg and aldactone 25 mg daily. Cont other meds   - reassess in 1 month       3) Morbid Obesity   - in 2019 he has lost 50 lbs then gained 20 lbs back   - continue to work weight loss and consider weight loss surgery    - has class 2 JOSUE   - On 12/20/22 - has has lost 35 lbs past few months --- continue to work on diet and exercise     4) 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 He is [employer] fan. Lives with his girl friend.               Estefania Berg MD, [address]  [address] [facility] [address], [address]      [place] [address]  [address]  [address]                 [facility], [address] [place] [address]  Ph: [phone]                               Ph [phone]

## 2022-12-20 NOTE — PROGRESS NOTES
Chief Complaint   Patient presents with    Follow-up     3 mo   Afib        Vitals:    12/20/22 1557 12/20/22 1558   BP: (!) 160/100 (!) 166/100   BP 1 Location: Right upper arm Left upper arm   BP Patient Position: Sitting Sitting   Pulse: 87    Height: 6' 2\" (1.88 m)    Weight: (!) 352 lb (159.7 kg)    SpO2: 98%        Chest pain denied     SOB denied     Palpitations denied     Swelling in hands/feet denied     Dizziness denied     Recent hospital stays denied     Refills denied

## 2023-01-12 NOTE — TELEPHONE ENCOUNTER
Refill per VO of Dr. Arthur Auguste  Last appt: 12/20/2022  Future Appointments   Date Time Provider Lui Gandara   1/30/2023  1:00 PM Hali Kebede MD [facility]       Requested Prescriptions     Pending Prescriptions Disp Refills    spironolactone (ALDACTONE) 25 mg tablet 90 Tablet 3     Sig: Take 1 Tablet by mouth daily.

## 2023-01-13 RX ORDER — SPIRONOLACTONE 25 MG/1
25 TABLET ORAL DAILY
Qty: 90 TABLET | Refills: 3 | Status: SHIPPED | OUTPATIENT
Start: 2023-01-13

## 2023-01-16 NOTE — TELEPHONE ENCOUNTER
Refill per VO of Dr. Dusty Thorne  Last appt: 12/20/2022  Future Appointments   Date Time Provider Lui Hilliardi   1/30/2023  1:00 PM Lili Jenkins MD [facility]       Requested Prescriptions     Pending Prescriptions Disp Refills    chlorthalidone (HYGROTON) 25 mg tablet 90 Tablet 3     Sig: Take 1 Tablet by mouth daily. apixaban (ELIQUIS) 5 mg tablet 180 Tablet 3     Sig: Take 1 Tablet by mouth two (2) times a day.

## 2023-01-17 RX ORDER — CHLORTHALIDONE 25 MG/1
25 TABLET ORAL DAILY
Qty: 90 TABLET | Refills: 3 | Status: SHIPPED | OUTPATIENT
Start: 2023-01-17

## 2023-01-23 ENCOUNTER — TELEPHONE (OUTPATIENT)
Dept: CARDIOLOGY CLINIC | Age: 58
End: 2023-01-23

## 2023-01-23 DIAGNOSIS — I10 HYPERTENSION, UNSPECIFIED TYPE: Primary | ICD-10-CM

## 2023-01-23 NOTE — TELEPHONE ENCOUNTER
Called pt, LVM  Per Dr. Thu Polanco: \"Since Cr has increased, can you please ask him to cut chlorthalidone dose in half. Recheck a BMP in 1 month. \"

## 2023-01-30 ENCOUNTER — OFFICE VISIT (OUTPATIENT)
Dept: CARDIOLOGY CLINIC | Age: 58
End: 2023-01-30
Payer: COMMERCIAL

## 2023-01-30 VITALS
SYSTOLIC BLOOD PRESSURE: 120 MMHG | HEART RATE: 64 BPM | OXYGEN SATURATION: 98 % | BODY MASS INDEX: 37.86 KG/M2 | DIASTOLIC BLOOD PRESSURE: 76 MMHG | HEIGHT: 74 IN | WEIGHT: 295 LBS

## 2023-01-30 DIAGNOSIS — I48.21 PERMANENT ATRIAL FIBRILLATION (HCC): Primary | ICD-10-CM

## 2023-01-30 DIAGNOSIS — E66.9 OBESITY WITHOUT SERIOUS COMORBIDITY, UNSPECIFIED CLASSIFICATION, UNSPECIFIED OBESITY TYPE: ICD-10-CM

## 2023-01-30 DIAGNOSIS — I10 ESSENTIAL HYPERTENSION: ICD-10-CM

## 2023-01-30 DIAGNOSIS — E78.5 DYSLIPIDEMIA: ICD-10-CM

## 2023-01-30 DIAGNOSIS — I10 ESSENTIAL HYPERTENSION, BENIGN: ICD-10-CM

## 2023-01-30 PROCEDURE — 3078F DIAST BP <80 MM HG: CPT | Performed by: SPECIALIST

## 2023-01-30 PROCEDURE — 99214 OFFICE O/P EST MOD 30 MIN: CPT | Performed by: SPECIALIST

## 2023-01-30 PROCEDURE — 3074F SYST BP LT 130 MM HG: CPT | Performed by: SPECIALIST

## 2023-01-30 RX ORDER — LABETALOL 200 MG/1
400 TABLET, FILM COATED ORAL 2 TIMES DAILY
Qty: 360 TABLET | Refills: 3 | Status: SHIPPED | OUTPATIENT
Start: 2023-01-30

## 2023-01-30 RX ORDER — SPIRONOLACTONE 25 MG/1
25 TABLET ORAL DAILY
Qty: 90 TABLET | Refills: 3 | Status: SHIPPED | OUTPATIENT
Start: 2023-01-30

## 2023-01-30 RX ORDER — DILTIAZEM HYDROCHLORIDE 180 MG/1
180 CAPSULE, COATED, EXTENDED RELEASE ORAL DAILY
Qty: 90 CAPSULE | Refills: 3 | Status: SHIPPED | OUTPATIENT
Start: 2023-01-30

## 2023-01-30 RX ORDER — LOSARTAN POTASSIUM 100 MG/1
100 TABLET ORAL DAILY
Qty: 90 TABLET | Refills: 3 | Status: SHIPPED | OUTPATIENT
Start: 2023-01-30

## 2023-01-30 RX ORDER — CHLORTHALIDONE 25 MG/1
12.5 TABLET ORAL DAILY
Qty: 45 TABLET | Refills: 3 | Status: SHIPPED | OUTPATIENT
Start: 2023-01-30

## 2023-01-30 NOTE — PROGRESS NOTES
No chief complaint on file.     Vitals:    01/30/23 1255   BP: 120/76   BP 1 Location: Left upper arm   BP Patient Position: Sitting   Pulse: 64   Height: 6' 2\" (1.88 m)   Weight: 295 lb (133.8 kg)   SpO2: 98%       Chest pain denied     SOB denied     Palpitations denied     Swelling in hands/feet denied     Dizziness denied     Recent hospital stays denied     Refills denied

## 2023-01-30 NOTE — PROGRESS NOTES
James Barraza MD. [facility]              Patient: Gary Chand  : 1965      Today's Date: 2023            HISTORY OF PRESENT ILLNESS:     History of Present Illness:  Doing OK. He had COVID in 2021 - has had SOB since then. Myranda catalan. Has class 2 JOSUE. No CP. He feels well - still losing weight on eating well. PAST MEDICAL HISTORY:     Past Medical History:   Diagnosis Date    Atrial fibrillation ([address] 2002    COVID-19     2021    Hypertension     Hypertensive Heart Disease    Long term (current) use of anticoagulants     Obesity     JING (obstructive sleep apnea) 2007    Rx C-PAP             CURRENT MEDICATIONS:      Current Outpatient Medications   Medication Sig Dispense Refill    chlorthalidone (HYGROTON) 25 mg tablet Take 1 Tablet by mouth daily. (Patient taking differently: Take 12.5 mg by mouth daily. Decreased dose as of 23 per Dr. Gabino Shannon; Cr elevated.) 90 Tablet 3    apixaban (ELIQUIS) 5 mg tablet Take 1 Tablet by mouth two (2) times a day. 180 Tablet 3    spironolactone (ALDACTONE) 25 mg tablet Take 1 Tablet by mouth daily. 90 Tablet 3    losartan (COZAAR) 100 mg tablet TAKE 1 TABLET BY MOUTH EVERY DAY 90 Tablet 3    labetaloL (NORMODYNE) 200 mg tablet TAKE 2 TABLETS BY MOUTH TWICE A  Tablet 3    dilTIAZem ER (CARDIZEM CD) 180 mg capsule Take 1 Capsule by mouth daily. 90 Capsule 3    multivitamin (ONE A DAY) tablet Take 1 Tab by mouth daily. cpap machine kit by Does Not Apply route. 1 Kit 0       Allergies   Allergen Reactions    Enalapril Angioedema    Shellfish Containing Products Swelling             SOCIAL HISTORY:     Social History     Tobacco Use    Smoking status: Never    Smokeless tobacco: Never   Substance Use Topics    Alcohol use:  Yes     Alcohol/week: 6.0 standard drinks     Types: 6 Cans of beer per week     Comment: up to 6 beers/week    Drug use: No         FAMILY HISTORY:     Family History   Problem Relation Age of Onset    Coronary Art Dis Mother     Heart Attack Mother           REVIEW OF SYMPTOMS:      Review of Symptoms:  Constitutional: Negative for fever, chills  HEENT: Negative for nosebleeds, tinnitus, and vision changes. Respiratory: Negative for cough, wheezing  Cardiovascular: Negative for orthopnea, claudication, syncope, and PND. Gastrointestinal: Negative for abdominal pain, diarrhea, melena. Genitourinary: Negative for dysuria  Musculoskeletal: Negative for myalgias. Skin: Negative for rash  Heme: No problems bleeding. Neurological: Negative for speech change and focal weakness. PHYSICAL EXAM:      Physical Exam:  Visit Vitals  /76 (BP 1 Location: Left upper arm, BP Patient Position: Sitting)   Pulse 64   Ht 6' 2\" (1.88 m)   Wt 295 lb (133.8 kg)   SpO2 98%   BMI 37.88 kg/m²       Patient appears generally well, mood and affect are appropriate and pleasant. HEENT:  Hearing intact, non-icteric, normocephalic, atraumatic. Neck Exam: Supple,   Lung Exam: Clear to auscultation, even breath sounds. Cardiac Exam: Irregularly irregular with no murmur or rub  Abdomen: Soft, non-tender, normal bowel sounds. Obese   Extremities: Moves all ext well. No lower extremity edema. MSKTL: Overall good ROM ext  Skin: No significant rashes  Psych: Appropriate affect  Neuro - Grossly intact           LABS / OTHER STUDIES reviewed:         Lab Results   Component Value Date/Time    Sodium 137 01/19/2023 04:45 PM    Potassium 4.2 01/19/2023 04:45 PM    Chloride 98 01/19/2023 04:45 PM    CO2 24 01/19/2023 04:45 PM    Anion gap 6 11/03/2009 02:59 PM    Glucose 98 01/19/2023 04:45 PM    BUN 29 (H) 01/19/2023 04:45 PM    Creatinine 1.40 (H) 01/19/2023 04:45 PM    BUN/Creatinine ratio 21 (H) 01/19/2023 04:45 PM    GFR est AA 97 07/18/2020 07:46 AM    GFR est non-AA 84 07/18/2020 07:46 AM    Calcium 9.5 01/19/2023 04:45 PM    Bilirubin, total 0.8 09/03/2022 10:59 AM    Alk.  phosphatase 63 09/03/2022 10:59 AM    Protein, total 7.6 09/03/2022 10:59 AM    Albumin 4.5 09/03/2022 10:59 AM    Globulin 3.6 11/03/2009 02:59 PM    A-G Ratio 1.5 09/03/2022 10:59 AM    ALT (SGPT) 16 09/03/2022 10:59 AM    AST (SGOT) 16 09/03/2022 10:59 AM       Lab Results   Component Value Date/Time    WBC 4.8 09/03/2022 10:59 AM    HGB 12.9 (L) 09/03/2022 10:59 AM    HCT 38.9 09/03/2022 10:59 AM    PLATELET 161 85/50/3347 10:59 AM    MCV 92 09/03/2022 10:59 AM       Lab Results   Component Value Date/Time    Cholesterol, total 152 07/18/2020 07:46 AM    Cholesterol, Total 171 09/03/2022 10:59 AM    HDL Cholesterol 47 07/18/2020 07:46 AM    LDL, calculated 90 07/18/2020 07:46 AM    VLDL, calculated 15 07/18/2020 07:46 AM    Triglyceride 74 07/18/2020 07:46 AM       Lab Results   Component Value Date/Time    TSH 1.320 09/03/2022 10:59 AM             CARDIAC DIAGNOSTICS:      Cardiac Evaluation Includes:  I reviewed the results below. Echo 4/14 - TDS, Definity, LVEF 50%, LAE  Treadmill Stress Test 10/22/15 - walked 6 min (7 METS), no ischemic EKG changes or chest pain, he was in Afib reaching 103% MPHR (he had his labetalol this AM)  Echo 6/27/16 - TDS. Definity. LVEF 50%. RV normal. Mod LAE. Mild MR  Treadmill Stress Test 7/26/17 - walked 4 min (7.0 METS), normal study   Treadmill stress test 6/6/19 - EKG with Afib, HR, 105, PRWP at start of study --  walked 6 min (7.5 METS), normal stress test      Echo 6/26/19 - TDS. Definity. Mild LV dilation. LVEF 55%. Mod LAE.      ECHO 5/20/2021   · LV: Estimated LVEF is 45 - 50%. Visually measured ejection fraction. Normal wall thickness. Mildly dilated left ventricle. Left ventricular diastolic dysfunction. · RV: Mildly dilated right ventricle. · LA: Severely dilated left atrium. · RA: Severely dilated right atrium. · MV: Mitral valve thickening. Moderate mitral valve regurgitation is present. · TV: Mild tricuspid valve regurgitation is present.  Right Ventricular Arterial Pressure (RVSP) is 39 mmHg. · IVC: Moderately elevated central venous pressure (8 mmHg); IVC diameter is larger than 21 mm and collapses more than 50% with respiration. EKG 4/21/15 - Afib, PVC's, possible anteroseptal infarct age undetermined (PRWP)  EKG 4/28/16 - Afib, HR 80, possible anteroseptal infarct age undetermined, non-specific T wave abn  EKG 2/7/17 - Atrial fibrillation, PVC, Cannot rule out Lateral infarct  Old, PRWP, RAD  EKG 5/3/18 - Afib, PRWP, PVC  EKG 7/9/20 - Afib, old anterior infarct   EKG 9/1/22 - Afib, old anterior infarct, low voltage       Treadmill Stress Test 6/28/21 - walked 6:25 (8.2 METS), baseline Afib - no ischemic EKG changes. A couple of couplets and triplets in exercise. Echo 9/1/22 - TDS. LVEF 50-55%, severe LAE,        ASSESSMENT AND PLAN:      Assessment and Plan:     1) Chronic Afib  - continue rate control and anticoagulation  - Cont Eliquis   - HR OK  - cont labetalol and cardizem      2) HTN  - Goal BP at least < 130/80 at home ideally  - On 12/20/22 - BP mildly high at home, but higher in the office ----> Will stop triamterene-HCTZ and instead use switch him to chlorthalidone 25 mg and aldactone 25 mg daily. Cont other meds   - On 1/30/23 - BP is better - 120's/70's. Cr lele to 1.4 so we cut chlorthalidone to 12.5 mg daily. Will continue regimen as is. He is also losing weight which is helping with his HTN - can cut back meds as is. Follow renal function.       3) Morbid Obesity   - in 2019 he has lost 50 lbs then gained 20 lbs back   - continue to work weight loss and consider weight loss surgery    - has class 2 JOSUE   - On 12/20/22 - has has lost 35 lbs past few months --- continue to work on diet and exercise   - As of 1/30/23 - he has lost 50+ lbs     4) Pre-participation stress test for the [employer]  - he will need stress test every 2 years  - No ischemic EKG changes on 6/28/21 treadmill stress test      5) NSVT on stress test 6/28/21 -- dicussed options (further CAD workup)  and he wants to be watched clinically and proceed to further testing based on symptoms   - will continue BB -  No high risk findings on the treadmill stress test  - Echo looks OK 9/22    6) JING on CPAP     7) See me in 6 months. He is [employer] fan. Lives with his girl friend. Taylor Gresham MD, [address]   [address], [address]      [place] [address]  [address]  [address]  Ph: [phone]                                [phone]

## 2023-02-27 ENCOUNTER — TELEPHONE (OUTPATIENT)
Dept: CARDIOLOGY CLINIC | Age: 58
End: 2023-02-27

## 2023-02-27 DIAGNOSIS — D64.9 ANEMIA, UNSPECIFIED TYPE: Primary | ICD-10-CM

## 2023-02-27 NOTE — TELEPHONE ENCOUNTER
Carmen - this is Dr. Giles Lanes patient. Can you call him and let him know that his cholesterol has improved - no changes to medications are necessary. All other labs look stable. His Hemoglobin is mildly decreased at 10.4. Can you ask about s/s of bleeding and have him recheck Hgb/Hct in 4-6 weeks. I will put order in. Thank you! Zaina Nieto with Pt, Name and  verified. Pt verbalized understanding of above msg. He would like to have a copy of his result sent out via mail along with the future lab order. I printed the lab, Will you please sent me the lab result as a letter to sent out. Thanks.

## 2023-02-27 NOTE — LETTER
2/27/2023 1:45 PM    Mr. Sarah Renner [address] [address]    Lab Results   Component Value Date/Time    WBC 5.2 02/25/2023 10:11 AM    HGB 10.4 (L) 02/25/2023 10:11 AM    HCT 30.7 (L) 02/25/2023 10:11 AM    PLATELET 616 (L) 55/88/9903 10:11 AM    MCV 92 02/25/2023 10:11 AM       Lab Results   Component Value Date/Time    Sodium 140 02/25/2023 10:11 AM    Potassium 4.1 02/25/2023 10:11 AM    Chloride 104 02/25/2023 10:11 AM    CO2 22 02/25/2023 10:11 AM    Anion gap 6 11/03/2009 02:59 PM    Glucose 94 02/25/2023 10:11 AM    BUN 15 02/25/2023 10:11 AM    Creatinine 1.15 02/25/2023 10:11 AM    BUN/Creatinine ratio 13 02/25/2023 10:11 AM    GFR est AA 97 07/18/2020 07:46 AM    GFR est non-AA 84 07/18/2020 07:46 AM    eGFR 75 02/25/2023 10:11 AM    Calcium 9.0 02/25/2023 10:11 AM     Lab Results   Component Value Date/Time    TSH 0.999 02/25/2023 10:11 AM     Results for orders placed or performed in visit on 01/30/23   [facility] [address] [facility] (WITHOUT GRAPH)     Status: Abnormal   Result Value Ref Range Status    LDL-P 788 <1,000 nmol/L Final     Comment:                           Low                   < 1000                            Moderate         1000 - 1299                            Borderline-High  1300 - 1599                            High             1600 - 2000                            Very High             > 2000      LDL-C(NIH CALC) 69 0 - 99 mg/dL Final     Comment:                           Optimal               <  100                            Above optimal     100 -  129                            Borderline        130 -  159                            High              160 -  189                            Very high             >  189      HDL-C 52 >39 mg/dL Final    Triglycerides 42 0 - 149 mg/dL Final    Cholesterol, Total 131 100 - 199 mg/dL Final    HDL-P (Total) 24.1 (L) >=30.5 umol/L Final    Small LDL-P 223 <=527 nmol/L Final    LDL size 21.0 >20.5 nm Final Comment:  ----------------------------------------------------------                   ** INTERPRETATIVE INFORMATION**                   PARTICLE CONCENTRATION AND SIZE                      <--Lower CVD Risk   Higher CVD Risk-->    LDL AND HDL PARTICLES   Percentile in Reference Population    HDL-P (total)        High     75th    50th    25th   Low                         >34.9    34.9    30.5    26.7   <26.7    Small LDL-P          Low      25th    50th    75th   High                         <117     117     527     839    >839    LDL Size   <-Large (Pattern A)->    <-Small (Pattern B)->                      23.0    20.6           20.5      19.0   ----------------------------------------------------------  Small LDL-P and LDL Size are associated with CVD risk, but not after  LDL-P is taken into account. LP-IR SCORE <25 <=45 Final     Comment: INSULIN RESISTANCE MARKER      <--Insulin Sensitive    Insulin Resistant-->             Percentile in Reference Population  Insulin Resistance Score  LP-IR Score   Low   25th   50th   75th   High                <27   27     45     63     >63  LP-IR Score is inaccurate if patient is non-fasting. The LP-IR score is a laboratory developed index that has been  associated with insulin resistance and diabetes risk and should be  used as one component of a physician's clinical assessment. Narrative    Test(s) 958500-RTT-O; 222130-BXQ-Z; 306131-Kiqmjtaqlqtzf; 449900-  Cholesterol, Total; 766958-OXZ-U (Total); 884297-Small LDL-P; 441032-  LDL Size; 198681-DV-LS Score  was developed and its performance characteristics determined  by [facility]. It has not been cleared or approved by the Food  and Drug Administration.   Performed at:  [facility]  [address]  [id number]  : Josh Dailey MD, Phone:  [phone]             Sincerely,      Phan Mak MD

## 2023-09-15 NOTE — TELEPHONE ENCOUNTER
Refill per VO of Dr. Brice Bustillo  Last appt: 1/30/2023    Future Appointments   Date Time Provider [address] [place] [address]   9/18/2023  3:20 PM Jacinda Shelton MD [facility] BS AMB       Requested Prescriptions     Pending Prescriptions Disp Refills    dilTIAZem (CARDIZEM CD) 180 MG extended release capsule [Pharmacy Med Name: DILTIAZEM 24H ER(CD) 180 MG CP] 90 capsule 3     Sig: TAKE 1 CAPSULE BY MOUTH EVERY DAY

## 2023-09-18 ENCOUNTER — OFFICE VISIT (OUTPATIENT)
Age: 58
End: 2023-09-18
Payer: COMMERCIAL

## 2023-09-18 VITALS
DIASTOLIC BLOOD PRESSURE: 90 MMHG | WEIGHT: 315 LBS | HEIGHT: 74 IN | BODY MASS INDEX: 40.43 KG/M2 | SYSTOLIC BLOOD PRESSURE: 154 MMHG | OXYGEN SATURATION: 94 % | HEART RATE: 84 BPM

## 2023-09-18 DIAGNOSIS — I10 ESSENTIAL HYPERTENSION: ICD-10-CM

## 2023-09-18 DIAGNOSIS — I48.21 PERMANENT ATRIAL FIBRILLATION (HCC): Primary | ICD-10-CM

## 2023-09-18 DIAGNOSIS — E66.01 MORBID OBESITY (HCC): ICD-10-CM

## 2023-09-18 PROCEDURE — 99214 OFFICE O/P EST MOD 30 MIN: CPT | Performed by: SPECIALIST

## 2023-09-18 PROCEDURE — 93000 ELECTROCARDIOGRAM COMPLETE: CPT | Performed by: SPECIALIST

## 2023-09-18 PROCEDURE — 3080F DIAST BP >= 90 MM HG: CPT | Performed by: SPECIALIST

## 2023-09-18 PROCEDURE — 3077F SYST BP >= 140 MM HG: CPT | Performed by: SPECIALIST

## 2023-09-18 NOTE — PROGRESS NOTES
Jenifer Villafana MD. [facility]          Patient: Gladis Ratcliff  : 1965      Today's Date: 2023        HISTORY OF PRESENT ILLNESS:     History of Present Illness:  Doing OK - stable cardiac wise  - class 1 AHMADI        PAST MEDICAL HISTORY:     Past Medical History:   Diagnosis Date    Atrial fibrillation ([address]) 2002    COVID-19     2021    Hypertension     Hypertensive Heart Disease    Long term (current) use of anticoagulants     Obesity     DAISY (obstructive sleep apnea) 2007    Rx C-PAP           CURRENT MEDICATIONS:    .  Current Outpatient Medications   Medication Sig Dispense Refill    apixaban (ELIQUIS) 5 MG TABS tablet Take 1 tablet by mouth 2 times daily      chlorthalidone (HYGROTON) 25 MG tablet Take 0.5 tablets by mouth daily      dilTIAZem (TIAZAC) 180 MG extended release capsule Take 1 capsule by mouth daily      labetalol (NORMODYNE) 200 MG tablet Take 2 tablets by mouth 2 times daily      losartan (COZAAR) 100 MG tablet Take 1 tablet by mouth daily      spironolactone (ALDACTONE) 25 MG tablet Take 1 tablet by mouth daily       No current facility-administered medications for this visit. Allergies   Allergen Reactions    Enalapril Angioedema    Shellfish Allergy Swelling         SOCIAL HISTORY:     Social History     Tobacco Use    Smoking status: Never    Smokeless tobacco: Never   Substance Use Topics    Alcohol use: Yes     Alcohol/week: 6.0 standard drinks of alcohol    Drug use: No         FAMILY HISTORY:     Family History   Problem Relation Age of Onset    Heart Attack Mother     Coronary Art Dis Mother          REVIEW OF SYMPTOMS:     Review of Symptoms:  Constitutional: Negative for fever, chills  HEENT: Negative for nosebleeds, tinnitus, and vision changes. Respiratory: Negative for cough, wheezing  Cardiovascular: Negative for orthopnea, claudication, syncope  Gastrointestinal: Negative for abdominal pain, diarrhea, melena.    Genitourinary: Negative for

## 2023-09-18 NOTE — PROGRESS NOTES
John Ronquillo is a 63 y.o. male    had concerns including Follow-up (8 months), Atrial Fibrillation, and Hypertension. Vitals:    09/18/23 1522 09/18/23 1534   BP: (!) 156/98 (!) 154/90   Site: Right Upper Arm Left Upper Arm   Position: Sitting Sitting   Pulse: 84    SpO2: 94%    Weight: (!) 348 lb (157.9 kg)    Height: 6' 2\" (1.88 m)         Chest pain NO    SOB NO    Dizziness NO    Swelling NO    Palpitations NO    Refills NO    Covid Vaccinated yes      1. Have you been to the ER, urgent care clinic since your last visit? Hospitalized since your last visit? NO    2. Have you seen or consulted any other health care providers outside of the [address] [place] [address] since your last visit? Include any pap smears or colon screening.  NO

## 2023-09-19 RX ORDER — DILTIAZEM HYDROCHLORIDE 180 MG/1
CAPSULE, COATED, EXTENDED RELEASE ORAL DAILY
Qty: 90 CAPSULE | Refills: 3 | Status: SHIPPED | OUTPATIENT
Start: 2023-09-19

## 2023-10-23 ENCOUNTER — TELEPHONE (OUTPATIENT)
Age: 58
End: 2023-10-23

## 2023-10-23 NOTE — TELEPHONE ENCOUNTER
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 Wants to discuss.     Pt. Phone - [phone]

## 2023-10-24 DIAGNOSIS — I10 ESSENTIAL (PRIMARY) HYPERTENSION: ICD-10-CM

## 2023-10-26 RX ORDER — LABETALOL 200 MG/1
400 TABLET, FILM COATED ORAL 2 TIMES DAILY
Qty: 360 TABLET | Refills: 3 | Status: SHIPPED | OUTPATIENT
Start: 2023-10-26

## 2023-10-26 RX ORDER — LOSARTAN POTASSIUM 100 MG/1
100 TABLET ORAL DAILY
Qty: 90 TABLET | Refills: 3 | Status: SHIPPED | OUTPATIENT
Start: 2023-10-26

## 2023-10-26 NOTE — TELEPHONE ENCOUNTER
Refill per VO of Dr. Amelia Marcos  Last appt: 9/18/2023    Future Appointments   Date Time Provider [address]   11/16/2023  3:20 PM SAMAN Canales NP AMB       Requested Prescriptions     Signed Prescriptions Disp Refills    labetalol (NORMODYNE) 200 MG tablet 360 tablet 3     Sig: TAKE 2 TABLETS BY MOUTH TWICE A DAY     Authorizing [facility]: [facility]     Ordering User: Sharad NOVAK    losartan (COZAAR) 100 MG tablet 90 tablet 3     Sig: TAKE 1 TABLET BY MOUTH EVERY DAY     Authorizing [facility]: [facility]     Ordering User: Mya Durant

## 2023-11-15 RX ORDER — SPIRONOLACTONE 25 MG/1
25 TABLET ORAL DAILY
Qty: 90 TABLET | Refills: 3 | Status: SHIPPED | OUTPATIENT
Start: 2023-11-15

## 2023-11-15 NOTE — TELEPHONE ENCOUNTER
Refill per VO of Dr. Katlyn Kraft  Last appt: 9/18/2023    Future Appointments   Date Time Provider [address]   1/4/2024  2:40 PM SAMAN Pardo NP       Requested Prescriptions     Signed Prescriptions Disp Refills    spironolactone (ALDACTONE) 25 MG tablet 90 tablet 3     Sig: TAKE 1 TABLET BY MOUTH EVERY DAY     Authorizing Provider: Dio Dodeg     Ordering User: Jeffy Mortensen

## 2024-01-22 RX ORDER — APIXABAN 5 MG/1
5 TABLET, FILM COATED ORAL 2 TIMES DAILY
Qty: 60 TABLET | Refills: 3 | Status: SHIPPED | OUTPATIENT
Start: 2024-01-22

## 2024-01-22 NOTE — TELEPHONE ENCOUNTER
Refill per VO of Dr. Saravia  Last appt: 9/18/2023    Future Appointments   Date Time Provider Department Center   2/15/2024  3:20 PM Nini Basurto, APRN - NP [facility]       Requested Prescriptions     Signed Prescriptions Disp Refills    apixaban (ELIQUIS) 5 MG TABS tablet 60 tablet 3     Sig: TAKE 1 TABLET BY MOUTH TWO TIMES A DAY.     Authorizing Provider: DEAN SARAVIA     Ordering User: JULIETH HART

## 2024-02-16 RX ORDER — CHLORTHALIDONE 25 MG/1
25 TABLET ORAL DAILY
Qty: 90 TABLET | Refills: 1 | Status: SHIPPED | OUTPATIENT
Start: 2024-02-16

## 2024-02-16 NOTE — TELEPHONE ENCOUNTER
Refill per VO of Dr. Saravia  Last appt: 9/18/2023    Future Appointments   Date Time Provider Department Center   4/11/2024  3:20 PM Nini Basurto, APRN - NP [facility]B       Requested Prescriptions     Signed Prescriptions Disp Refills    chlorthalidone (HYGROTON) 25 MG tablet 90 tablet 1     Sig: Take 1 tablet by mouth daily     Authorizing Provider: DEAN SARAVIA     Ordering User: JULIETH HART

## 2024-05-24 ENCOUNTER — TELEPHONE (OUTPATIENT)
Age: 59
End: 2024-05-24

## 2024-05-24 NOTE — TELEPHONE ENCOUNTER
Refill per VO of Dr. Saravia  Last appt: 9/18/2023    No future appointments.    Requested Prescriptions     Signed Prescriptions Disp Refills    apixaban (ELIQUIS) 5 MG TABS tablet 60 tablet 3     Sig: Take 1 tablet by mouth 2 times daily     Authorizing Provider: DEAN SARAVIA     Ordering User: JULIETH HART

## 2024-05-30 RX ORDER — APIXABAN 5 MG/1
5 TABLET, FILM COATED ORAL 2 TIMES DAILY
Qty: 60 TABLET | Refills: 3 | Status: SHIPPED | OUTPATIENT
Start: 2024-05-30

## 2024-05-30 NOTE — TELEPHONE ENCOUNTER
Refill per VO of Dr. Saravia  Last appt: 9/18/2023    No future appointments.    Requested Prescriptions     Signed Prescriptions Disp Refills    ELIQUIS 5 MG TABS tablet 60 tablet 3     Sig: TAKE 1 TABLET BY MOUTH TWICE A DAY     Authorizing Provider: DEAN SARAVIA     Ordering User: JULIETH HART

## 2024-07-23 RX ORDER — DILTIAZEM HYDROCHLORIDE 180 MG/1
CAPSULE, COATED, EXTENDED RELEASE ORAL DAILY
Qty: 90 CAPSULE | Refills: 0 | Status: SHIPPED | OUTPATIENT
Start: 2024-07-23

## 2024-07-26 ENCOUNTER — TELEPHONE (OUTPATIENT)
Age: 59
End: 2024-07-26

## 2024-07-26 NOTE — TELEPHONE ENCOUNTER
Patient left voicemail on 07/22/2024 to schedule an appointment. Returned patient's call and left voicemail to call office to schedule.

## 2024-08-29 DIAGNOSIS — I10 ESSENTIAL (PRIMARY) HYPERTENSION: ICD-10-CM

## 2024-08-29 RX ORDER — LABETALOL 200 MG/1
400 TABLET, FILM COATED ORAL 2 TIMES DAILY
Qty: 360 TABLET | Refills: 0 | Status: SHIPPED | OUTPATIENT
Start: 2024-08-29

## 2024-09-11 ENCOUNTER — OFFICE VISIT (OUTPATIENT)
Age: 59
End: 2024-09-11
Payer: COMMERCIAL

## 2024-09-11 VITALS
SYSTOLIC BLOOD PRESSURE: 138 MMHG | HEIGHT: 74 IN | HEART RATE: 94 BPM | WEIGHT: 315 LBS | DIASTOLIC BLOOD PRESSURE: 88 MMHG | BODY MASS INDEX: 40.43 KG/M2 | OXYGEN SATURATION: 98 %

## 2024-09-11 DIAGNOSIS — I48.11 LONGSTANDING PERSISTENT ATRIAL FIBRILLATION (HCC): ICD-10-CM

## 2024-09-11 DIAGNOSIS — R06.09 DOE (DYSPNEA ON EXERTION): ICD-10-CM

## 2024-09-11 DIAGNOSIS — E78.5 DYSLIPIDEMIA: ICD-10-CM

## 2024-09-11 DIAGNOSIS — I10 ESSENTIAL HYPERTENSION: ICD-10-CM

## 2024-09-11 DIAGNOSIS — R06.02 SHORTNESS OF BREATH: ICD-10-CM

## 2024-09-11 DIAGNOSIS — R94.31 ABNORMAL ELECTROCARDIOGRAPHY: ICD-10-CM

## 2024-09-11 DIAGNOSIS — R73.03 PREDIABETES: ICD-10-CM

## 2024-09-11 DIAGNOSIS — I10 ESSENTIAL (PRIMARY) HYPERTENSION: Primary | ICD-10-CM

## 2024-09-11 PROCEDURE — 3079F DIAST BP 80-89 MM HG: CPT | Performed by: SPECIALIST

## 2024-09-11 PROCEDURE — 99214 OFFICE O/P EST MOD 30 MIN: CPT | Performed by: SPECIALIST

## 2024-09-11 PROCEDURE — 3075F SYST BP GE 130 - 139MM HG: CPT | Performed by: SPECIALIST

## 2024-09-11 PROCEDURE — 93000 ELECTROCARDIOGRAM COMPLETE: CPT | Performed by: SPECIALIST

## 2024-09-15 LAB
ALBUMIN SERPL-MCNC: 4.4 G/DL (ref 3.8–4.9)
ALP SERPL-CCNC: 81 IU/L (ref 44–121)
ALT SERPL-CCNC: 19 IU/L (ref 0–44)
AST SERPL-CCNC: 16 IU/L (ref 0–40)
BASOPHILS # BLD AUTO: 0.1 X10E3/UL (ref 0–0.2)
BASOPHILS NFR BLD AUTO: 1 %
BILIRUB SERPL-MCNC: 0.8 MG/DL (ref 0–1.2)
BUN SERPL-MCNC: 20 MG/DL (ref 6–24)
BUN/CREAT SERPL: 15 (ref 9–20)
CALCIUM SERPL-MCNC: 10.2 MG/DL (ref 8.7–10.2)
CHLORIDE SERPL-SCNC: 102 MMOL/L (ref 96–106)
CO2 SERPL-SCNC: 23 MMOL/L (ref 20–29)
CREAT SERPL-MCNC: 1.3 MG/DL (ref 0.76–1.27)
EGFRCR SERPLBLD CKD-EPI 2021: 64 ML/MIN/1.73
EOSINOPHIL # BLD AUTO: 0.2 X10E3/UL (ref 0–0.4)
EOSINOPHIL NFR BLD AUTO: 4 %
ERYTHROCYTE [DISTWIDTH] IN BLOOD BY AUTOMATED COUNT: 12 % (ref 11.6–15.4)
GLOBULIN SER CALC-MCNC: 3.2 G/DL (ref 1.5–4.5)
GLUCOSE SERPL-MCNC: 148 MG/DL (ref 70–99)
HCT VFR BLD AUTO: 35.5 % (ref 37.5–51)
HGB BLD-MCNC: 11.7 G/DL (ref 13–17.7)
IMM GRANULOCYTES # BLD AUTO: 0 X10E3/UL (ref 0–0.1)
IMM GRANULOCYTES NFR BLD AUTO: 0 %
LYMPHOCYTES # BLD AUTO: 1.6 X10E3/UL (ref 0.7–3.1)
LYMPHOCYTES NFR BLD AUTO: 26 %
MAGNESIUM SERPL-MCNC: 1.8 MG/DL (ref 1.6–2.3)
MCH RBC QN AUTO: 31.2 PG (ref 26.6–33)
MCHC RBC AUTO-ENTMCNC: 33 G/DL (ref 31.5–35.7)
MCV RBC AUTO: 95 FL (ref 79–97)
MONOCYTES # BLD AUTO: 0.4 X10E3/UL (ref 0.1–0.9)
MONOCYTES NFR BLD AUTO: 7 %
NEUTROPHILS # BLD AUTO: 3.7 X10E3/UL (ref 1.4–7)
NEUTROPHILS NFR BLD AUTO: 62 %
PLATELET # BLD AUTO: 255 X10E3/UL (ref 150–450)
POTASSIUM SERPL-SCNC: 4.7 MMOL/L (ref 3.5–5.2)
PROT SERPL-MCNC: 7.6 G/DL (ref 6–8.5)
RBC # BLD AUTO: 3.75 X10E6/UL (ref 4.14–5.8)
SODIUM SERPL-SCNC: 138 MMOL/L (ref 134–144)
TSH SERPL DL<=0.005 MIU/L-ACNC: 0.93 UIU/ML (ref 0.45–4.5)
WBC # BLD AUTO: 6 X10E3/UL (ref 3.4–10.8)

## 2024-09-16 LAB
CHOLEST SERPL-MCNC: 147 MG/DL (ref 100–199)
HBA1C MFR BLD: 8.3 % (ref 4.8–5.6)
HDL SERPL-SCNC: 22.4 UMOL/L
HDLC SERPL-MCNC: 41 MG/DL
LDL SERPL QN: 21.5 NM
LDL SERPL-SCNC: 1170 NMOL/L
LDL SMALL SERPL-SCNC: 451 NMOL/L
LDLC SERPL CALC-MCNC: 92 MG/DL (ref 0–99)
TRIGL SERPL-MCNC: 71 MG/DL (ref 0–149)

## 2024-09-17 ENCOUNTER — TELEPHONE (OUTPATIENT)
Age: 59
End: 2024-09-17

## 2024-09-17 RX ORDER — APIXABAN 5 MG/1
5 TABLET, FILM COATED ORAL 2 TIMES DAILY
Qty: 180 TABLET | Refills: 2 | Status: SHIPPED | OUTPATIENT
Start: 2024-09-17

## 2024-10-18 ENCOUNTER — TELEPHONE (OUTPATIENT)
Age: 59
End: 2024-10-18

## 2024-10-18 NOTE — TELEPHONE ENCOUNTER
Received a c/b from patient, ID verified using two patient identifiers. Writer discussed below with patient who stated understanding.    Per Dr. Saravia; \"Can you please let her know that stress test is normal   Thanks\"

## 2024-10-18 NOTE — TELEPHONE ENCOUNTER
Future Appointments   Date Time Provider Department Center   10/25/2024  3:30 PM [facility] ECHO 1 [facility] AMB   11/8/2024 10:40 AM Jumana Frausto MD [facility] BS AMB     Left message for a c/b to discuss below    Per Dr. Saravia;  \"Can you please let her know that stress test is normal   Thanks\"

## 2024-10-21 ENCOUNTER — TELEPHONE (OUTPATIENT)
Age: 59
End: 2024-10-21

## 2024-10-21 NOTE — TELEPHONE ENCOUNTER
R/t call to SHELLIE best  Previous phone msg 10/18/24: \"Received a c/b from patient, ID verified using two patient identifiers. Writer discussed below with patient who stated understanding.     Per Dr. Saravia; \"Can you please let him know that stress test is normal   Thanks\"'

## 2024-10-21 NOTE — TELEPHONE ENCOUNTER
Patient would like a call back from the nurse regarding his stress  test results .      Patient # [phone]

## 2024-10-22 ENCOUNTER — TELEPHONE (OUTPATIENT)
Age: 59
End: 2024-10-22

## 2024-10-22 RX ORDER — DILTIAZEM HYDROCHLORIDE 180 MG/1
CAPSULE, COATED, EXTENDED RELEASE ORAL DAILY
Qty: 90 CAPSULE | Refills: 1 | Status: SHIPPED | OUTPATIENT
Start: 2024-10-22

## 2024-10-22 RX ORDER — LOSARTAN POTASSIUM 100 MG/1
100 TABLET ORAL DAILY
Qty: 90 TABLET | Refills: 1 | Status: SHIPPED | OUTPATIENT
Start: 2024-10-22

## 2024-10-22 RX ORDER — SPIRONOLACTONE 25 MG/1
25 TABLET ORAL DAILY
Qty: 90 TABLET | Refills: 1 | Status: SHIPPED | OUTPATIENT
Start: 2024-10-22

## 2024-10-22 NOTE — TELEPHONE ENCOUNTER
R/t call to pt,  LVM  Asking to confirm if he needs a copy of the stress test results or if there is a specific form we need to fill out?!

## 2024-10-22 NOTE — TELEPHONE ENCOUNTER
Patient just missed a call, he did previously reach out about physical please follow up    P#: [phone]

## 2024-10-22 NOTE — TELEPHONE ENCOUNTER
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    [phone]   Problem: Infection  Goal: Will remain free from infection  Intervention: Assess signs and symptoms of infection  Encouraged pt to notify me for any s/s of infection      Problem: Knowledge Deficit  Goal: Knowledge of disease process/condition, treatment plan, diagnostic tests, and medications will improve  Intervention: Assess knowledge level of disease process/condition, treatment plan, diagnostic tests, and medications  Pt encouraged to verbalize needs and wants

## 2024-10-28 ENCOUNTER — TELEPHONE (OUTPATIENT)
Age: 59
End: 2024-10-28

## 2024-10-28 NOTE — TELEPHONE ENCOUNTER
Called pt,  LVM  Per Dr. Nico Saravia: \"Can you please let him know heart function is stable and normal.\"    NV needed x6 mos from 9/11/24

## 2024-10-29 ENCOUNTER — TELEPHONE (OUTPATIENT)
Age: 59
End: 2024-10-29

## 2024-10-30 NOTE — TELEPHONE ENCOUNTER
Pt r/t call,  Relayed results from Echo.  Made 6 mos follow up apt.  Future Appointments   Date Time Provider Department Center   11/8/2024 10:40 AM Jumana Frausto MD [facility] BS AMB   3/24/2025  3:40 PM Nico Saravia MD [facility] AMB

## 2024-11-08 ENCOUNTER — CLINICAL DOCUMENTATION (OUTPATIENT)
Age: 59
End: 2024-11-08

## 2024-11-08 ENCOUNTER — OFFICE VISIT (OUTPATIENT)
Age: 59
End: 2024-11-08
Payer: COMMERCIAL

## 2024-11-08 VITALS
SYSTOLIC BLOOD PRESSURE: 134 MMHG | WEIGHT: 315 LBS | HEART RATE: 56 BPM | HEIGHT: 74 IN | TEMPERATURE: 98.2 F | OXYGEN SATURATION: 98 % | DIASTOLIC BLOOD PRESSURE: 76 MMHG | BODY MASS INDEX: 40.43 KG/M2

## 2024-11-08 DIAGNOSIS — I10 PRIMARY HYPERTENSION: ICD-10-CM

## 2024-11-08 DIAGNOSIS — G47.33 OSA (OBSTRUCTIVE SLEEP APNEA): Primary | ICD-10-CM

## 2024-11-08 DIAGNOSIS — I48.0 PAROXYSMAL ATRIAL FIBRILLATION (HCC): ICD-10-CM

## 2024-11-08 PROCEDURE — 3078F DIAST BP <80 MM HG: CPT | Performed by: INTERNAL MEDICINE

## 2024-11-08 PROCEDURE — 99204 OFFICE O/P NEW MOD 45 MIN: CPT | Performed by: INTERNAL MEDICINE

## 2024-11-08 PROCEDURE — 3075F SYST BP GE 130 - 139MM HG: CPT | Performed by: INTERNAL MEDICINE

## 2024-11-08 ASSESSMENT — SLEEP AND FATIGUE QUESTIONNAIRES
HOW LIKELY ARE YOU TO NOD OFF OR FALL ASLEEP IN A CAR, WHILE STOPPED FOR A FEW MINUTES IN TRAFFIC: WOULD NEVER DOZE
HOW LIKELY ARE YOU TO NOD OFF OR FALL ASLEEP WHILE SITTING AND READING: SLIGHT CHANCE OF DOZING
HOW LIKELY ARE YOU TO NOD OFF OR FALL ASLEEP WHILE SITTING AND TALKING TO SOMEONE: SLIGHT CHANCE OF DOZING
HOW LIKELY ARE YOU TO NOD OFF OR FALL ASLEEP WHILE SITTING INACTIVE IN A PUBLIC PLACE: SLIGHT CHANCE OF DOZING
ESS TOTAL SCORE: 5
HOW LIKELY ARE YOU TO NOD OFF OR FALL ASLEEP WHILE WATCHING TV: SLIGHT CHANCE OF DOZING
HOW LIKELY ARE YOU TO NOD OFF OR FALL ASLEEP WHILE SITTING QUIETLY AFTER LUNCH WITHOUT ALCOHOL: WOULD NEVER DOZE
HOW LIKELY ARE YOU TO NOD OFF OR FALL ASLEEP WHEN YOU ARE A PASSENGER IN A CAR FOR AN HOUR WITHOUT A BREAK: SLIGHT CHANCE OF DOZING
HOW LIKELY ARE YOU TO NOD OFF OR FALL ASLEEP WHILE LYING DOWN TO REST IN THE AFTERNOON WHEN CIRCUMSTANCES PERMIT: WOULD NEVER DOZE

## 2024-11-08 NOTE — PROGRESS NOTES
[address] [place] [address], [place] [address]  Tel.  [phone]    Fax. [phone]     [address] [place], [place] [address]  Tel.  [phone]    Fax. [phone] [address] [place] [address]  Tel.  [phone]    Fax. [phone]       Smith Granger is a 59 y.o. year old male seen for evaluation of a sleep disorder.       ASSESSMENT/PLAN:     Diagnosis Orders   1. DAISY (obstructive sleep apnea)  DME Order for (Specify) as OP      2. Paroxysmal atrial fibrillation (HCC)        3. Primary hypertension        4. BMI 45.0-49.9, adult            Patient has a history and examination consistent with the diagnosis of sleep apnea.    Return for follow-up in 3 months or as needed.      * Sleep testing was ordered for initial evaluation.      Orders Placed This Encounter   Procedures    DME Order for (Specify) as OP     - DME device ordered - ResMed Device with Heated Humidifer  / .   - Diagnosis: Obstructive Sleep Apnea (G47.33)  - Length of Need: Lifetime    Pressure Settin - 20 cmH2O     Nasal Cushion (Replace) 2 per month.     Nasal Interface Mask 1 every 3 months.    Headgear 1 every 6 months.     Filter(s) Disposable 2 per month.   Filter(s) Non-Disposable 1 every 6 months.      Water Chamber for Humidifier (Replace) 1 every 6 months.   Tubing with heating element 1 every 3 months.    Perform Mask Fitting per patient preference and comfort - replace as above.      Jumana Frausto MD, FAASM; NPI: [id number]  Electronically signed. 2024       * He was provided information on sleep apnea including corresponding risk factors and the importance of proper treatment.     * Treatment options were reviewed in detail. he would like to proceed with PAP therapy. Patient will be seen in follow-up in 6-8 weeks after PAP setup to gauge treatment response and

## 2024-11-28 DIAGNOSIS — I10 ESSENTIAL (PRIMARY) HYPERTENSION: ICD-10-CM

## 2024-11-29 RX ORDER — LABETALOL 200 MG/1
400 TABLET, FILM COATED ORAL 2 TIMES DAILY
Qty: 360 TABLET | Refills: 1 | Status: SHIPPED | OUTPATIENT
Start: 2024-11-29

## 2024-11-29 NOTE — TELEPHONE ENCOUNTER
Per verbal order from Dr. Nico Saravia  Last appt: 9/18/2023     Future Appointments   Date Time Provider Department Center   2/20/2025  1:40 PM Jumana Frausto MD [facility] BS AMB   3/24/2025  3:40 PM Nico Saravia MD [facility] BS AMB       Requested Prescriptions     Signed Prescriptions Disp Refills    labetalol (NORMODYNE) 200 MG tablet 360 tablet 1     Sig: TAKE 2 TABLETS BY MOUTH TWICE A DAY     Authorizing Provider: NICO SARAVIA     Ordering User: LAKEISHA EMANUEL

## 2025-02-17 ENCOUNTER — TELEPHONE (OUTPATIENT)
Age: 60
End: 2025-02-17

## 2025-02-17 NOTE — TELEPHONE ENCOUNTER
Left voicemail with patient to change his appointment to a virtual visit on 02/20/2025 due to the anticipated inclement weather. Patient returned call to office and rescheduled this appointment for an in person appointment. Scheduled patient for 02/27/2025.

## 2025-02-26 ASSESSMENT — SLEEP AND FATIGUE QUESTIONNAIRES
HOW LIKELY ARE YOU TO NOD OFF OR FALL ASLEEP WHILE WATCHING TV: SLIGHT CHANCE OF DOZING
HOW LIKELY ARE YOU TO NOD OFF OR FALL ASLEEP IN A CAR, WHILE STOPPED FOR A FEW MINUTES IN TRAFFIC: WOULD NEVER DOZE
HOW LIKELY ARE YOU TO NOD OFF OR FALL ASLEEP WHILE LYING DOWN TO REST IN THE AFTERNOON WHEN CIRCUMSTANCES PERMIT: SLIGHT CHANCE OF DOZING
HOW LIKELY ARE YOU TO NOD OFF OR FALL ASLEEP WHILE SITTING INACTIVE IN A PUBLIC PLACE: WOULD NEVER DOZE
HOW LIKELY ARE YOU TO NOD OFF OR FALL ASLEEP WHILE SITTING QUIETLY AFTER LUNCH WITHOUT ALCOHOL: SLIGHT CHANCE OF DOZING
HOW LIKELY ARE YOU TO NOD OFF OR FALL ASLEEP WHILE SITTING AND TALKING TO SOMEONE: WOULD NEVER DOZE
HOW LIKELY ARE YOU TO NOD OFF OR FALL ASLEEP WHEN YOU ARE A PASSENGER IN A CAR FOR AN HOUR WITHOUT A BREAK: SLIGHT CHANCE OF DOZING
HOW LIKELY ARE YOU TO NOD OFF OR FALL ASLEEP WHILE SITTING AND READING: SLIGHT CHANCE OF DOZING
ESS TOTAL SCORE: 5

## 2025-03-03 RX ORDER — LOSARTAN POTASSIUM 100 MG/1
100 TABLET ORAL DAILY
Qty: 90 TABLET | Refills: 3 | Status: SHIPPED | OUTPATIENT
Start: 2025-03-03

## 2025-03-03 RX ORDER — SPIRONOLACTONE 25 MG/1
25 TABLET ORAL DAILY
Qty: 90 TABLET | Refills: 3 | Status: SHIPPED | OUTPATIENT
Start: 2025-03-03

## 2025-03-03 RX ORDER — DILTIAZEM HYDROCHLORIDE 180 MG/1
CAPSULE, COATED, EXTENDED RELEASE ORAL DAILY
Qty: 90 CAPSULE | Refills: 3 | Status: SHIPPED | OUTPATIENT
Start: 2025-03-03

## 2025-03-03 RX ORDER — CHLORTHALIDONE 25 MG/1
25 TABLET ORAL DAILY
Qty: 90 TABLET | Refills: 3 | Status: SHIPPED | OUTPATIENT
Start: 2025-03-03

## 2025-03-03 NOTE — TELEPHONE ENCOUNTER
Refill per VO of Dr. Saravia  Last appt: 9/11/2024    Future Appointments   Date Time Provider Department Center   3/24/2025  3:40 PM Nico Saravia MD [facility] BS AMB   4/15/2025  9:30 AM Gertrude Angelo, APRN - NP [facility] BS AMB       Requested Prescriptions     Signed Prescriptions Disp Refills    dilTIAZem (CARDIZEM CD) 180 MG extended release capsule 90 capsule 3     Sig: TAKE 1 CAPSULE BY MOUTH EVERY DAY     Authorizing Provider: NICO SARAVIA     Ordering User: JOSUÉ TRINH    spironolactone (ALDACTONE) 25 MG tablet 90 tablet 3     Sig: TAKE 1 TABLET BY MOUTH EVERY DAY     Authorizing Provider: NICO SARAVIA     Ordering User: JOSUÉ TRINH    losartan (COZAAR) 100 MG tablet 90 tablet 3     Sig: TAKE 1 TABLET BY MOUTH EVERY DAY     Authorizing Provider: NICO SARAVIA     Ordering User: JOSUÉ TRINH    chlorthalidone (HYGROTON) 25 MG tablet 90 tablet 3     Sig: TAKE 1 TABLET BY MOUTH EVERY DAY     Authorizing Provider: NICO SARAVIA     Ordering User: JOSUÉ TRINH

## 2025-04-02 ENCOUNTER — OFFICE VISIT (OUTPATIENT)
Age: 60
End: 2025-04-02
Payer: COMMERCIAL

## 2025-04-02 VITALS
SYSTOLIC BLOOD PRESSURE: 142 MMHG | BODY MASS INDEX: 40.43 KG/M2 | WEIGHT: 315 LBS | HEART RATE: 76 BPM | DIASTOLIC BLOOD PRESSURE: 90 MMHG | HEIGHT: 74 IN

## 2025-04-02 DIAGNOSIS — R73.09 ELEVATED GLUCOSE: ICD-10-CM

## 2025-04-02 DIAGNOSIS — I10 ESSENTIAL (PRIMARY) HYPERTENSION: Primary | ICD-10-CM

## 2025-04-02 DIAGNOSIS — E78.5 DYSLIPIDEMIA: ICD-10-CM

## 2025-04-02 DIAGNOSIS — I48.11 LONGSTANDING PERSISTENT ATRIAL FIBRILLATION (HCC): ICD-10-CM

## 2025-04-02 PROCEDURE — 3080F DIAST BP >= 90 MM HG: CPT | Performed by: SPECIALIST

## 2025-04-02 PROCEDURE — 3077F SYST BP >= 140 MM HG: CPT | Performed by: SPECIALIST

## 2025-04-02 PROCEDURE — 99214 OFFICE O/P EST MOD 30 MIN: CPT | Performed by: SPECIALIST

## 2025-04-02 PROCEDURE — 93000 ELECTROCARDIOGRAM COMPLETE: CPT | Performed by: SPECIALIST

## 2025-04-02 NOTE — PROGRESS NOTES
Nico Saravia MD. [facility]          Patient: Smith Granger  : 1965      Today's Date: 2025        HISTORY OF PRESENT ILLNESS:     History of Present Illness:  Doing OK - stable cardiac wise  - class 1 AHMADI  BPs at home 130s/80's      PAST MEDICAL HISTORY:     Past Medical History:   Diagnosis Date    Atrial fibrillation (HCC) 2002    COVID-2021    Diabetes (HCC)     Hypertension     Hypertensive Heart Disease    Long term (current) use of anticoagulants     Obesity     DAISY (obstructive sleep apnea) 2007    Rx C-PAP           CURRENT MEDICATIONS:    .  Current Outpatient Medications   Medication Sig Dispense Refill    dilTIAZem (CARDIZEM CD) 180 MG extended release capsule TAKE 1 CAPSULE BY MOUTH EVERY DAY 90 capsule 3    spironolactone (ALDACTONE) 25 MG tablet TAKE 1 TABLET BY MOUTH EVERY DAY 90 tablet 3    losartan (COZAAR) 100 MG tablet TAKE 1 TABLET BY MOUTH EVERY DAY 90 tablet 3    chlorthalidone (HYGROTON) 25 MG tablet TAKE 1 TABLET BY MOUTH EVERY DAY 90 tablet 3    labetalol (NORMODYNE) 200 MG tablet TAKE 2 TABLETS BY MOUTH TWICE A  tablet 1    Multiple Vitamin (MULTIVITAMIN ADULT PO) Take 1 tablet by mouth daily      apixaban (ELIQUIS) 5 MG TABS tablet TAKE 1 TABLET BY MOUTH TWICE A  tablet 2     No current facility-administered medications for this visit.       Allergies   Allergen Reactions    Enalapril Angioedema    Shellfish Allergy Swelling         SOCIAL HISTORY:     Social History     Tobacco Use    Smoking status: Never     Passive exposure: Never    Smokeless tobacco: Never   Substance Use Topics    Alcohol use: Yes     Alcohol/week: 6.0 standard drinks of alcohol    Drug use: No         FAMILY HISTORY:     Family History   Problem Relation Age of Onset    Heart Attack Mother     Coronary Art Dis Mother          REVIEW OF SYMPTOMS:     Review of Symptoms:  Constitutional: Negative for fever, chills  HEENT: Negative for nosebleeds, tinnitus, and

## 2025-04-02 NOTE — PROGRESS NOTES
BP (!) 142/90   Pulse 76   Ht 1.88 m (6' 2\")   Wt (!) 153.3 kg (338 lb)   BMI 43.40 kg/m²     DOT Physical

## 2025-04-02 NOTE — PATIENT INSTRUCTIONS
Patient Education        Learning About the Mediterranean Diet  What is the Mediterranean diet?     The Mediterranean diet is a style of eating rather than a diet plan. It features foods eaten in [place], [place], [place], and other countries along the [place] Sea. It emphasizes eating foods like fish, fruits, vegetables, beans, high-fiber breads and whole grains, nuts, and olive oil. This style of eating includes limited red meat, cheese, and sweets.  Why choose the Mediterranean diet?  A Mediterranean-style diet may improve heart health. It contains more fat than other heart-healthy diets. But the fats are mainly from nuts, unsaturated oils (such as fish oils and olive oil), and certain nut or seed oils (such as canola, soybean, or flaxseed oil). These fats may help protect the heart and blood vessels.  How can you get started on the Mediterranean diet?  Here are some things you can do to switch to a more Mediterranean way of eating.  What to eat  Eat a variety of fruits and vegetables each day, such as grapes, blueberries, tomatoes, broccoli, peppers, figs, olives, spinach, eggplant, beans, lentils, and chickpeas.  Eat a variety of whole-grain foods each day, such as oats, brown rice, and whole wheat bread, pasta, and couscous.  Eat fish at least 2 times a week. Try tuna, salmon, mackerel, lake trout, herring, or sardines.  Eat moderate amounts of low-fat dairy products, such as milk, cheese, or yogurt.  Eat moderate amounts of poultry and eggs.  Choose healthy (unsaturated) fats, such as nuts, olive oil, and certain nut or seed oils like canola, soybean, and flaxseed.  Limit unhealthy (saturated) fats, such as butter, palm oil, and coconut oil. And limit fats found in animal products, such as meat and dairy products made with whole milk. Try to eat red meat only a few times a month in very small amounts.  Limit sweets and desserts to only a few times a week. This includes sugar-sweetened

## 2025-04-14 ASSESSMENT — SLEEP AND FATIGUE QUESTIONNAIRES
DO YOU HAVE DIFFICULTY OPERATING A MOTOR VEHICLE FOR LONG DISTANCES (GREATER THAN 100 MILES) BECAUSE YOU BECOME SLEEPY: NO
HOW LIKELY ARE YOU TO NOD OFF OR FALL ASLEEP WHILE SITTING AND READING: SLIGHT CHANCE OF DOZING
HOW LIKELY ARE YOU TO NOD OFF OR FALL ASLEEP WHILE LYING DOWN TO REST IN THE AFTERNOON WHEN CIRCUMSTANCES PERMIT: SLIGHT CHANCE OF DOZING
HOW LIKELY ARE YOU TO NOD OFF OR FALL ASLEEP WHILE SITTING AND TALKING TO SOMEONE: WOULD NEVER DOZE
HOW LIKELY ARE YOU TO NOD OFF OR FALL ASLEEP WHILE LYING DOWN TO REST IN THE AFTERNOON WHEN CIRCUMSTANCES PERMIT: SLIGHT CHANCE OF DOZING
HOW LIKELY ARE YOU TO NOD OFF OR FALL ASLEEP WHEN YOU ARE A PASSENGER IN A CAR FOR AN HOUR WITHOUT A BREAK: SLIGHT CHANCE OF DOZING
DO YOU HAVE DIFFICULTY BEING AS ACTIVE AS YOU WANT TO BE IN THE MORNING BECAUSE YOU ARE SLEEPY OR TIRED: NO
HAS YOUR MOOD BEEN AFFECTED BECAUSE YOU ARE SLEEPY OR TIRED: YES, LITTLE
HOW LIKELY ARE YOU TO NOD OFF OR FALL ASLEEP IN A CAR, WHILE STOPPED FOR A FEW MINUTES IN TRAFFIC: WOULD NEVER DOZE
ESS TOTAL SCORE: 5
DO YOU HAVE DIFFICULTY BEING AS ACTIVE AS YOU WANT TO BE IN THE EVENING BECAUSE YOU ARE SLEEPY OR TIRED: NO
DO YOU HAVE DIFFICULTY CONCENTRATING ON THE THINGS YOU DO BECAUSE YOU ARE SLEEPY OR TIRED: NO
DO YOU GENERALLY HAVE DIFFICULTY REMEMBERING THINGS BECAUSE YOU ARE SLEEPY OR TIRED: NO
HOW LIKELY ARE YOU TO NOD OFF OR FALL ASLEEP WHILE SITTING AND TALKING TO SOMEONE: WOULD NEVER DOZE
HOW LIKELY ARE YOU TO NOD OFF OR FALL ASLEEP WHILE WATCHING TV: SLIGHT CHANCE OF DOZING
HOW LIKELY ARE YOU TO NOD OFF OR FALL ASLEEP WHILE SITTING QUIETLY AFTER LUNCH WITHOUT ALCOHOL: SLIGHT CHANCE OF DOZING
DO YOU HAVE DIFFICULTY VISITING YOUR FAMILY OR FRIENDS IN THEIR HOME BECAUSE YOU BECOME SLEEPY OR TIRED: NO
FOSQ SCORE: 19
DO YOU HAVE DIFFICULTY OPERATING A MOTOR VEHICLE FOR SHORT DISTANCES (LESS THAN 100 MILES) BECAUSE YOU BECOME SLEEPY: NO
HAS YOUR RELATIONSHIP WITH FAMILY, FRIENDS OR WORK COLLEAGUES BEEN AFFECTED BECAUSE YOU ARE SLEEPY OR TIRED: NO
HOW LIKELY ARE YOU TO NOD OFF OR FALL ASLEEP WHILE SITTING QUIETLY AFTER LUNCH WITHOUT ALCOHOL: SLIGHT CHANCE OF DOZING
HOW LIKELY ARE YOU TO NOD OFF OR FALL ASLEEP WHEN YOU ARE A PASSENGER IN A CAR FOR AN HOUR WITHOUT A BREAK: SLIGHT CHANCE OF DOZING
HOW LIKELY ARE YOU TO NOD OFF OR FALL ASLEEP WHILE SITTING INACTIVE IN A PUBLIC PLACE: WOULD NEVER DOZE
HOW LIKELY ARE YOU TO NOD OFF OR FALL ASLEEP IN A CAR, WHILE STOPPED FOR A FEW MINUTES IN TRAFFIC: WOULD NEVER DOZE
HOW LIKELY ARE YOU TO NOD OFF OR FALL ASLEEP WHILE SITTING AND READING: SLIGHT CHANCE OF DOZING
HOW LIKELY ARE YOU TO NOD OFF OR FALL ASLEEP WHILE SITTING INACTIVE IN A PUBLIC PLACE: WOULD NEVER DOZE
HOW LIKELY ARE YOU TO NOD OFF OR FALL ASLEEP WHILE WATCHING TV: SLIGHT CHANCE OF DOZING
DO YOU HAVE DIFFICULTY WATCHING A MOVIE OR VIDEO BECAUSE YOU BECOME SLEEPY OR TIRED: YES, A LITTLE

## 2025-04-14 NOTE — PROGRESS NOTES
[address]   [place] [address]   Tel.  [phone]   Fax. [phone]  [address]   [place] [address]   Tel.  [phone]   Fax. [phone] [address] [address]   Tel.  [phone]   Fax. [phone]     Smith Granger (: 1965) is a 59 y.o. male, established patient, seen for positive airway pressure follow-up, he was last seen by Dr. Frausto on 2024, prior notes reviewed in detail. He was previously diagnosed with DAISY in  (AHI: 65.5 per hour - weight 303 lbs). He is seen today for follow up.     ASSESSMENT/PLAN:   Diagnosis Orders   1. DAISY (obstructive sleep apnea)  DME Order for (Specify) as OP      2. Paroxysmal atrial fibrillation (HCC)        3. Primary hypertension        4. BMI 40.0-44.9, adult          AHI = 65 ().  On CPAP, Resmed : 9-20 cmH2O. Set up 2024.    He is adherent with PAP therapy and PAP continues to benefit patient and remains necessary for control of his sleep apnea.     No follow-up provider specified.    Sleep Apnea -  Continue on current pressures.     Orders Placed This Encounter   Procedures    DME Order for (Specify) as OP     Diagnosis: (G47.33) DAISY (obstructive sleep apnea)  (primary encounter diagnosis)     Replacement Supplies for Positive Airway Pressure Therapy Device:   Duration of need: 99 months.      Full Face Mask 1 every 3 months.   Full Face Mask Cushion 1 per month.       Headgear 1 every 6 months.   Positive Airway Pressure chinstrap 1 every 6 months.     Tubing with heating element 1 every 3 months.     Filter(s) Disposable 2 per month.   Filter(s) Non-Disposable 1 every 6 months.   .    Water Chamber for Humidifier (Replace) 1 every 6 months.    [facility]: [id number]    Electronically signed. Date:- 04/15/25     *  Counseling was provided regarding the importance of regular PAP use with emphasis on ensuring

## 2025-04-15 ENCOUNTER — TELEMEDICINE (OUTPATIENT)
Age: 60
End: 2025-04-15
Payer: COMMERCIAL

## 2025-04-15 ENCOUNTER — CLINICAL DOCUMENTATION (OUTPATIENT)
Age: 60
End: 2025-04-15

## 2025-04-15 DIAGNOSIS — G47.33 OSA (OBSTRUCTIVE SLEEP APNEA): Primary | ICD-10-CM

## 2025-04-15 DIAGNOSIS — I10 PRIMARY HYPERTENSION: ICD-10-CM

## 2025-04-15 DIAGNOSIS — I48.0 PAROXYSMAL ATRIAL FIBRILLATION (HCC): ICD-10-CM

## 2025-04-15 PROCEDURE — 99214 OFFICE O/P EST MOD 30 MIN: CPT | Performed by: NURSE PRACTITIONER

## 2025-04-15 NOTE — PATIENT INSTRUCTIONS
nasal spray.  If these things do not help, you might try a different type of machine. Some machines have air pressure that adjusts on its own. Others have air pressures that are different when you breathe in than when you breathe out. This may reduce discomfort caused by too much pressure in your nose.               Where can you learn more?   Go to http://www.iClinical.net/Tamikacours  Enter X266 in the search box to learn more about \"Learning About CPAP for Sleep Apnea.\"   © 8050-7979 [employer]. Care instructions adapted under license by [employer] (which disclaims liability or warranty for this information). This care instruction is for use with your licensed healthcare professional. If you have questions about a medical condition or this instruction, always ask your healthcare professional. [employer] disclaims any warranty or liability for your use of this information.  Content Version: 8.9.17089; Last Revised: January 11, 2010  PROPER SLEEP HYGIENE    What to avoid  Do not have drinks with caffeine, such as coffee or black tea, for 8 hours before bed.  Do not smoke or use other types of tobacco near bedtime. Nicotine is a stimulant and can keep you awake.  Avoid drinking alcohol late in the evening, because it can cause you to wake in the middle of the night.  Do not eat a big meal close to bedtime. If you are hungry, eat a light snack.  Do not drink a lot of water close to bedtime, because the need to urinate may wake you up during the night.  Do not read or watch TV in bed. Use the bed only for sleeping and sexual activity.  What to try  Go to bed at the same time every night, and wake up at the same time every morning. Do not take naps during the day.  Keep your bedroom quiet, dark, and cool.  Get regular exercise, but not within 3 to 4 hours of your bedtime..  Sleep on a comfortable pillow and mattress.  If watching the clock makes you anxious, turn it facing away from you so

## 2025-05-29 DIAGNOSIS — I10 ESSENTIAL (PRIMARY) HYPERTENSION: ICD-10-CM

## 2025-05-29 RX ORDER — LABETALOL 200 MG/1
400 TABLET, FILM COATED ORAL 2 TIMES DAILY
Qty: 360 TABLET | Refills: 1 | Status: SHIPPED | OUTPATIENT
Start: 2025-05-29

## 2025-05-29 NOTE — TELEPHONE ENCOUNTER
Refill per VO of Dr. Saravia  Last appt: 4/2/2025    Future Appointments   Date Time Provider Department Center   7/31/2025  2:40 PM Nico Saravia MD [facility] BS AMB   4/15/2026  4:30 PM Ruth Patterson ACNP [facility] BS AMB       Requested Prescriptions     Signed Prescriptions Disp Refills    labetalol (NORMODYNE) 200 MG tablet 360 tablet 1     Sig: TAKE 2 TABLETS BY MOUTH TWICE A DAY     Authorizing Provider: NICO SARAVIA     Ordering User: CARMEN ERNST

## 2025-06-24 RX ORDER — APIXABAN 5 MG/1
5 TABLET, FILM COATED ORAL 2 TIMES DAILY
Qty: 180 TABLET | Refills: 2 | Status: SHIPPED | OUTPATIENT
Start: 2025-06-24